# Patient Record
Sex: FEMALE | Race: WHITE | NOT HISPANIC OR LATINO | Employment: OTHER | URBAN - METROPOLITAN AREA
[De-identification: names, ages, dates, MRNs, and addresses within clinical notes are randomized per-mention and may not be internally consistent; named-entity substitution may affect disease eponyms.]

---

## 2017-01-01 ENCOUNTER — APPOINTMENT (OUTPATIENT)
Dept: CT IMAGING | Facility: HOSPITAL | Age: 67
End: 2017-01-01

## 2017-01-01 ENCOUNTER — HOSPITAL ENCOUNTER (INPATIENT)
Facility: HOSPITAL | Age: 67
LOS: 6 days | End: 2017-01-14
Attending: EMERGENCY MEDICINE | Admitting: FAMILY MEDICINE

## 2017-01-01 ENCOUNTER — APPOINTMENT (OUTPATIENT)
Dept: GENERAL RADIOLOGY | Facility: HOSPITAL | Age: 67
End: 2017-01-01

## 2017-01-01 ENCOUNTER — TELEPHONE (OUTPATIENT)
Dept: INTERNAL MEDICINE | Facility: CLINIC | Age: 67
End: 2017-01-01

## 2017-01-01 VITALS
BODY MASS INDEX: 22.28 KG/M2 | TEMPERATURE: 99.3 F | HEIGHT: 68 IN | DIASTOLIC BLOOD PRESSURE: 78 MMHG | HEART RATE: 89 BPM | OXYGEN SATURATION: 99 % | RESPIRATION RATE: 16 BRPM | SYSTOLIC BLOOD PRESSURE: 173 MMHG | WEIGHT: 147 LBS

## 2017-01-01 DIAGNOSIS — R73.9 HYPERGLYCEMIA: ICD-10-CM

## 2017-01-01 DIAGNOSIS — E11.01 HYPEROSMOLAR COMA (HCC): ICD-10-CM

## 2017-01-01 DIAGNOSIS — E87.5 HYPERKALEMIA: ICD-10-CM

## 2017-01-01 DIAGNOSIS — N39.0 SEPSIS DUE TO URINARY TRACT INFECTION (HCC): ICD-10-CM

## 2017-01-01 DIAGNOSIS — I95.89 OTHER SPECIFIED HYPOTENSION: ICD-10-CM

## 2017-01-01 DIAGNOSIS — E08.11 DIABETIC KETOACIDOSIS WITH COMA ASSOCIATED WITH DIABETES MELLITUS DUE TO UNDERLYING CONDITION (HCC): Primary | ICD-10-CM

## 2017-01-01 DIAGNOSIS — R40.0 SOMNOLENCE: ICD-10-CM

## 2017-01-01 DIAGNOSIS — A41.9 SEPSIS DUE TO URINARY TRACT INFECTION (HCC): ICD-10-CM

## 2017-01-01 LAB
ALBUMIN SERPL-MCNC: 2.9 G/DL (ref 3.2–4.8)
ALBUMIN SERPL-MCNC: 3.2 G/DL (ref 3.2–4.8)
ALBUMIN/GLOB SERPL: 1.1 G/DL (ref 1.5–2.5)
ALBUMIN/GLOB SERPL: 1.1 G/DL (ref 1.5–2.5)
ALBUMIN/GLOB SERPL: 1.2 G/DL (ref 1.5–2.5)
ALBUMIN/GLOB SERPL: 1.2 G/DL (ref 1.5–2.5)
ALP SERPL-CCNC: 183 U/L (ref 25–100)
ALP SERPL-CCNC: 190 U/L (ref 25–100)
ALP SERPL-CCNC: 206 U/L (ref 25–100)
ALP SERPL-CCNC: 255 U/L (ref 25–100)
ALT SERPL W P-5'-P-CCNC: 10 U/L (ref 7–40)
ALT SERPL W P-5'-P-CCNC: 23 U/L (ref 7–40)
ALT SERPL W P-5'-P-CCNC: 3 U/L (ref 7–40)
ALT SERPL W P-5'-P-CCNC: 3 U/L (ref 7–40)
AMMONIA BLD-SCNC: 42 UMOL/L (ref 19–60)
ANION GAP SERPL CALCULATED.3IONS-SCNC: 10 MMOL/L (ref 3–11)
ANION GAP SERPL CALCULATED.3IONS-SCNC: 10 MMOL/L (ref 3–11)
ANION GAP SERPL CALCULATED.3IONS-SCNC: 11 MMOL/L (ref 3–11)
ANION GAP SERPL CALCULATED.3IONS-SCNC: 11 MMOL/L (ref 3–11)
ANION GAP SERPL CALCULATED.3IONS-SCNC: 12 MMOL/L (ref 3–11)
ANION GAP SERPL CALCULATED.3IONS-SCNC: 14 MMOL/L (ref 3–11)
ANION GAP SERPL CALCULATED.3IONS-SCNC: 15 MMOL/L (ref 3–11)
ANION GAP SERPL CALCULATED.3IONS-SCNC: 5 MMOL/L (ref 3–11)
ANION GAP SERPL CALCULATED.3IONS-SCNC: 7 MMOL/L (ref 3–11)
ANION GAP SERPL CALCULATED.3IONS-SCNC: 9 MMOL/L (ref 3–11)
ANION GAP SERPL CALCULATED.3IONS-SCNC: 9 MMOL/L (ref 3–11)
ANION GAP SERPL CALCULATED.3IONS-SCNC: ABNORMAL MMOL/L (ref 3–11)
ARTERIAL PATENCY WRIST A: ABNORMAL
ARTERIAL PATENCY WRIST A: ABNORMAL
AST SERPL-CCNC: 12 U/L (ref 0–33)
AST SERPL-CCNC: 22 U/L (ref 0–33)
AST SERPL-CCNC: 38 U/L (ref 0–33)
AST SERPL-CCNC: 63 U/L (ref 0–33)
ATMOSPHERIC PRESS: ABNORMAL MMHG
ATMOSPHERIC PRESS: ABNORMAL MMHG
B-OH-BUTYR SERPL-SCNC: 12.1 MMOL/L
BACTERIA SPEC AEROBE CULT: ABNORMAL
BACTERIA SPEC AEROBE CULT: ABNORMAL
BACTERIA SPEC AEROBE CULT: NORMAL
BACTERIA SPEC AEROBE CULT: NORMAL
BACTERIA UR QL AUTO: ABNORMAL /HPF
BASE EXCESS BLDA CALC-SCNC: -3.2 MMOL/L (ref 0–2)
BASE EXCESS BLDA CALC-SCNC: <-20 MMOL/L (ref 0–2)
BASOPHILS # BLD AUTO: 0.01 10*3/MM3 (ref 0–0.2)
BASOPHILS # BLD AUTO: 0.01 10*3/MM3 (ref 0–0.2)
BASOPHILS # BLD AUTO: 0.02 10*3/MM3 (ref 0–0.2)
BASOPHILS # BLD MANUAL: 0 10*3/MM3 (ref 0–0.2)
BASOPHILS NFR BLD AUTO: 0 % (ref 0–1)
BASOPHILS NFR BLD AUTO: 0.1 % (ref 0–1)
BASOPHILS NFR BLD AUTO: 0.1 % (ref 0–1)
BASOPHILS NFR BLD AUTO: 0.2 % (ref 0–1)
BDY SITE: ABNORMAL
BDY SITE: ABNORMAL
BILIRUB SERPL-MCNC: 0.2 MG/DL (ref 0.3–1.2)
BILIRUB SERPL-MCNC: 0.4 MG/DL (ref 0.3–1.2)
BILIRUB UR QL STRIP: ABNORMAL
BUN BLD-MCNC: 13 MG/DL (ref 9–23)
BUN BLD-MCNC: 14 MG/DL (ref 9–23)
BUN BLD-MCNC: 15 MG/DL (ref 9–23)
BUN BLD-MCNC: 21 MG/DL (ref 9–23)
BUN BLD-MCNC: 23 MG/DL (ref 9–23)
BUN BLD-MCNC: 24 MG/DL (ref 9–23)
BUN BLD-MCNC: 37 MG/DL (ref 9–23)
BUN BLD-MCNC: 41 MG/DL (ref 9–23)
BUN BLD-MCNC: 42 MG/DL (ref 9–23)
BUN BLD-MCNC: 45 MG/DL (ref 9–23)
BUN BLD-MCNC: 46 MG/DL (ref 9–23)
BUN BLD-MCNC: 46 MG/DL (ref 9–23)
BUN BLD-MCNC: 50 MG/DL (ref 9–23)
BUN/CREAT SERPL: 16.2 (ref 7–25)
BUN/CREAT SERPL: 18.4 (ref 7–25)
BUN/CREAT SERPL: 18.6 (ref 7–25)
BUN/CREAT SERPL: 18.6 (ref 7–25)
BUN/CREAT SERPL: 20 (ref 7–25)
BUN/CREAT SERPL: 20 (ref 7–25)
BUN/CREAT SERPL: 20.5 (ref 7–25)
BUN/CREAT SERPL: 21.7 (ref 7–25)
BUN/CREAT SERPL: 21.7 (ref 7–25)
BUN/CREAT SERPL: 21.8 (ref 7–25)
BUN/CREAT SERPL: 23 (ref 7–25)
BUN/CREAT SERPL: 23.3 (ref 7–25)
BUN/CREAT SERPL: 24.7 (ref 7–25)
BUN/CREAT SERPL: 25 (ref 7–25)
BUN/CREAT SERPL: 25.6 (ref 7–25)
C DIFF TOX GENS STL QL NAA+PROBE: DETECTED
CA-I SERPL ISE-MCNC: 0.96 MMOL/L (ref 1.12–1.32)
CA-I SERPL ISE-MCNC: 1.02 MMOL/L (ref 1.12–1.32)
CA-I SERPL ISE-MCNC: 1.03 MMOL/L (ref 1.12–1.32)
CA-I SERPL ISE-MCNC: 1.05 MMOL/L (ref 1.12–1.32)
CA-I SERPL ISE-MCNC: 1.05 MMOL/L (ref 1.12–1.32)
CA-I SERPL ISE-MCNC: 1.08 MMOL/L (ref 1.12–1.32)
CA-I SERPL ISE-MCNC: 1.09 MMOL/L (ref 1.12–1.32)
CA-I SERPL ISE-MCNC: 1.09 MMOL/L (ref 1.12–1.32)
CA-I SERPL ISE-MCNC: 1.12 MMOL/L (ref 1.12–1.32)
CA-I SERPL ISE-MCNC: 1.13 MMOL/L (ref 1.12–1.32)
CA-I SERPL ISE-MCNC: 1.19 MMOL/L (ref 1.12–1.32)
CALCIUM SPEC-SCNC: 7.5 MG/DL (ref 8.7–10.4)
CALCIUM SPEC-SCNC: 7.7 MG/DL (ref 8.7–10.4)
CALCIUM SPEC-SCNC: 7.8 MG/DL (ref 8.7–10.4)
CALCIUM SPEC-SCNC: 7.9 MG/DL (ref 8.7–10.4)
CALCIUM SPEC-SCNC: 8 MG/DL (ref 8.7–10.4)
CALCIUM SPEC-SCNC: 8.1 MG/DL (ref 8.7–10.4)
CALCIUM SPEC-SCNC: 8.6 MG/DL (ref 8.7–10.4)
CALCIUM SPEC-SCNC: 8.9 MG/DL (ref 8.7–10.4)
CHLORIDE SERPL-SCNC: 104 MMOL/L (ref 99–109)
CHLORIDE SERPL-SCNC: 108 MMOL/L (ref 99–109)
CHLORIDE SERPL-SCNC: 109 MMOL/L (ref 99–109)
CHLORIDE SERPL-SCNC: 109 MMOL/L (ref 99–109)
CHLORIDE SERPL-SCNC: 110 MMOL/L (ref 99–109)
CHLORIDE SERPL-SCNC: 112 MMOL/L (ref 99–109)
CHLORIDE SERPL-SCNC: 113 MMOL/L (ref 99–109)
CHLORIDE SERPL-SCNC: 114 MMOL/L (ref 99–109)
CHLORIDE SERPL-SCNC: 114 MMOL/L (ref 99–109)
CHLORIDE SERPL-SCNC: 116 MMOL/L (ref 99–109)
CHLORIDE SERPL-SCNC: 118 MMOL/L (ref 99–109)
CHLORIDE SERPL-SCNC: 119 MMOL/L (ref 99–109)
CHLORIDE SERPL-SCNC: 120 MMOL/L (ref 99–109)
CHLORIDE SERPL-SCNC: 123 MMOL/L (ref 99–109)
CHLORIDE SERPL-SCNC: 99 MMOL/L (ref 99–109)
CLARITY UR: ABNORMAL
CO2 BLDA-SCNC: 21.7 MMOL/L (ref 22–33)
CO2 BLDA-SCNC: 3.1 MMOL/L (ref 22–33)
CO2 SERPL-SCNC: 13 MMOL/L (ref 20–31)
CO2 SERPL-SCNC: 14 MMOL/L (ref 20–31)
CO2 SERPL-SCNC: 18 MMOL/L (ref 20–31)
CO2 SERPL-SCNC: 19 MMOL/L (ref 20–31)
CO2 SERPL-SCNC: 21 MMOL/L (ref 20–31)
CO2 SERPL-SCNC: 22 MMOL/L (ref 20–31)
CO2 SERPL-SCNC: 25 MMOL/L (ref 20–31)
CO2 SERPL-SCNC: <10 MMOL/L (ref 20–31)
COARSE GRAN CASTS URNS QL MICRO: ABNORMAL /LPF
COHGB MFR BLD: 1 % (ref 0–2)
COHGB MFR BLD: 1.3 % (ref 0–2)
COLOR UR: YELLOW
CREAT BLD-MCNC: 0.6 MG/DL (ref 0.6–1.3)
CREAT BLD-MCNC: 0.7 MG/DL (ref 0.6–1.3)
CREAT BLD-MCNC: 0.7 MG/DL (ref 0.6–1.3)
CREAT BLD-MCNC: 0.9 MG/DL (ref 0.6–1.3)
CREAT BLD-MCNC: 1 MG/DL (ref 0.6–1.3)
CREAT BLD-MCNC: 1.1 MG/DL (ref 0.6–1.3)
CREAT BLD-MCNC: 1.5 MG/DL (ref 0.6–1.3)
CREAT BLD-MCNC: 1.8 MG/DL (ref 0.6–1.3)
CREAT BLD-MCNC: 2.2 MG/DL (ref 0.6–1.3)
CREAT BLD-MCNC: 2.2 MG/DL (ref 0.6–1.3)
CREAT BLD-MCNC: 2.5 MG/DL (ref 0.6–1.3)
CREAT BLD-MCNC: 2.5 MG/DL (ref 0.6–1.3)
CREAT BLD-MCNC: 2.6 MG/DL (ref 0.6–1.3)
D-LACTATE SERPL-SCNC: 1.7 MMOL/L (ref 0.5–2)
D-LACTATE SERPL-SCNC: 7 MMOL/L (ref 0.5–2)
D-LACTATE SERPL-SCNC: 8.8 MMOL/L (ref 0.5–2)
DEPRECATED RDW RBC AUTO: 47.8 FL (ref 37–54)
DEPRECATED RDW RBC AUTO: 50.7 FL (ref 37–54)
DEPRECATED RDW RBC AUTO: 59 FL (ref 37–54)
DEPRECATED RDW RBC AUTO: 59.4 FL (ref 37–54)
DEPRECATED RDW RBC AUTO: 59.6 FL (ref 37–54)
EOSINOPHIL # BLD AUTO: 0.02 10*3/MM3 (ref 0.1–0.3)
EOSINOPHIL # BLD AUTO: 0.03 10*3/MM3 (ref 0.1–0.3)
EOSINOPHIL # BLD AUTO: 0.09 10*3/MM3 (ref 0.1–0.3)
EOSINOPHIL # BLD MANUAL: 0 10*3/MM3 (ref 0.1–0.3)
EOSINOPHIL NFR BLD AUTO: 0.1 % (ref 0–3)
EOSINOPHIL NFR BLD AUTO: 0.3 % (ref 0–3)
EOSINOPHIL NFR BLD AUTO: 0.9 % (ref 0–3)
EOSINOPHIL NFR BLD MANUAL: 0 % (ref 0–3)
ERYTHROCYTE [DISTWIDTH] IN BLOOD BY AUTOMATED COUNT: 13.9 % (ref 11.3–14.5)
ERYTHROCYTE [DISTWIDTH] IN BLOOD BY AUTOMATED COUNT: 14.5 % (ref 11.3–14.5)
ERYTHROCYTE [DISTWIDTH] IN BLOOD BY AUTOMATED COUNT: 14.6 % (ref 11.3–14.5)
ERYTHROCYTE [DISTWIDTH] IN BLOOD BY AUTOMATED COUNT: 14.9 % (ref 11.3–14.5)
ERYTHROCYTE [DISTWIDTH] IN BLOOD BY AUTOMATED COUNT: 15.1 % (ref 11.3–14.5)
GFR SERPL CREATININE-BSD FRML MDRD: 100 ML/MIN/1.73
GFR SERPL CREATININE-BSD FRML MDRD: 18 ML/MIN/1.73
GFR SERPL CREATININE-BSD FRML MDRD: 19 ML/MIN/1.73
GFR SERPL CREATININE-BSD FRML MDRD: 19 ML/MIN/1.73
GFR SERPL CREATININE-BSD FRML MDRD: 22 ML/MIN/1.73
GFR SERPL CREATININE-BSD FRML MDRD: 22 ML/MIN/1.73
GFR SERPL CREATININE-BSD FRML MDRD: 28 ML/MIN/1.73
GFR SERPL CREATININE-BSD FRML MDRD: 35 ML/MIN/1.73
GFR SERPL CREATININE-BSD FRML MDRD: 50 ML/MIN/1.73
GFR SERPL CREATININE-BSD FRML MDRD: 55 ML/MIN/1.73
GFR SERPL CREATININE-BSD FRML MDRD: 62 ML/MIN/1.73
GFR SERPL CREATININE-BSD FRML MDRD: 83 ML/MIN/1.73
GFR SERPL CREATININE-BSD FRML MDRD: 83 ML/MIN/1.73
GLOBULIN UR ELPH-MCNC: 2.4 GM/DL
GLOBULIN UR ELPH-MCNC: 2.4 GM/DL
GLOBULIN UR ELPH-MCNC: 2.6 GM/DL
GLOBULIN UR ELPH-MCNC: 2.8 GM/DL
GLUCOSE BLD-MCNC: 1052 MG/DL (ref 70–100)
GLUCOSE BLD-MCNC: 111 MG/DL (ref 70–100)
GLUCOSE BLD-MCNC: 112 MG/DL (ref 70–100)
GLUCOSE BLD-MCNC: 156 MG/DL (ref 70–100)
GLUCOSE BLD-MCNC: 172 MG/DL (ref 70–100)
GLUCOSE BLD-MCNC: 176 MG/DL (ref 70–100)
GLUCOSE BLD-MCNC: 196 MG/DL (ref 70–100)
GLUCOSE BLD-MCNC: 203 MG/DL (ref 70–100)
GLUCOSE BLD-MCNC: 208 MG/DL (ref 70–100)
GLUCOSE BLD-MCNC: 245 MG/DL (ref 70–100)
GLUCOSE BLD-MCNC: 246 MG/DL (ref 70–100)
GLUCOSE BLD-MCNC: 247 MG/DL (ref 70–100)
GLUCOSE BLD-MCNC: 330 MG/DL (ref 70–100)
GLUCOSE BLD-MCNC: 479 MG/DL (ref 70–100)
GLUCOSE BLD-MCNC: 645 MG/DL (ref 70–100)
GLUCOSE BLD-MCNC: 812 MG/DL (ref 70–100)
GLUCOSE BLD-MCNC: 940 MG/DL (ref 70–100)
GLUCOSE BLDC GLUCOMTR-MCNC: 103 MG/DL (ref 70–130)
GLUCOSE BLDC GLUCOMTR-MCNC: 113 MG/DL (ref 70–130)
GLUCOSE BLDC GLUCOMTR-MCNC: 118 MG/DL (ref 70–130)
GLUCOSE BLDC GLUCOMTR-MCNC: 118 MG/DL (ref 70–130)
GLUCOSE BLDC GLUCOMTR-MCNC: 119 MG/DL (ref 70–130)
GLUCOSE BLDC GLUCOMTR-MCNC: 125 MG/DL (ref 70–130)
GLUCOSE BLDC GLUCOMTR-MCNC: 127 MG/DL (ref 70–130)
GLUCOSE BLDC GLUCOMTR-MCNC: 130 MG/DL (ref 70–130)
GLUCOSE BLDC GLUCOMTR-MCNC: 132 MG/DL (ref 70–130)
GLUCOSE BLDC GLUCOMTR-MCNC: 133 MG/DL (ref 70–130)
GLUCOSE BLDC GLUCOMTR-MCNC: 135 MG/DL (ref 70–130)
GLUCOSE BLDC GLUCOMTR-MCNC: 136 MG/DL (ref 70–130)
GLUCOSE BLDC GLUCOMTR-MCNC: 137 MG/DL (ref 70–130)
GLUCOSE BLDC GLUCOMTR-MCNC: 139 MG/DL (ref 70–130)
GLUCOSE BLDC GLUCOMTR-MCNC: 140 MG/DL (ref 70–130)
GLUCOSE BLDC GLUCOMTR-MCNC: 143 MG/DL (ref 70–130)
GLUCOSE BLDC GLUCOMTR-MCNC: 144 MG/DL (ref 70–130)
GLUCOSE BLDC GLUCOMTR-MCNC: 145 MG/DL (ref 70–130)
GLUCOSE BLDC GLUCOMTR-MCNC: 147 MG/DL (ref 70–130)
GLUCOSE BLDC GLUCOMTR-MCNC: 147 MG/DL (ref 70–130)
GLUCOSE BLDC GLUCOMTR-MCNC: 149 MG/DL (ref 70–130)
GLUCOSE BLDC GLUCOMTR-MCNC: 150 MG/DL (ref 70–130)
GLUCOSE BLDC GLUCOMTR-MCNC: 150 MG/DL (ref 70–130)
GLUCOSE BLDC GLUCOMTR-MCNC: 153 MG/DL (ref 70–130)
GLUCOSE BLDC GLUCOMTR-MCNC: 155 MG/DL (ref 70–130)
GLUCOSE BLDC GLUCOMTR-MCNC: 157 MG/DL (ref 70–130)
GLUCOSE BLDC GLUCOMTR-MCNC: 160 MG/DL (ref 70–130)
GLUCOSE BLDC GLUCOMTR-MCNC: 161 MG/DL (ref 70–130)
GLUCOSE BLDC GLUCOMTR-MCNC: 165 MG/DL (ref 70–130)
GLUCOSE BLDC GLUCOMTR-MCNC: 168 MG/DL (ref 70–130)
GLUCOSE BLDC GLUCOMTR-MCNC: 169 MG/DL (ref 70–130)
GLUCOSE BLDC GLUCOMTR-MCNC: 170 MG/DL (ref 70–130)
GLUCOSE BLDC GLUCOMTR-MCNC: 171 MG/DL (ref 70–130)
GLUCOSE BLDC GLUCOMTR-MCNC: 171 MG/DL (ref 70–130)
GLUCOSE BLDC GLUCOMTR-MCNC: 172 MG/DL (ref 70–130)
GLUCOSE BLDC GLUCOMTR-MCNC: 178 MG/DL (ref 70–130)
GLUCOSE BLDC GLUCOMTR-MCNC: 181 MG/DL (ref 70–130)
GLUCOSE BLDC GLUCOMTR-MCNC: 181 MG/DL (ref 70–130)
GLUCOSE BLDC GLUCOMTR-MCNC: 190 MG/DL (ref 70–130)
GLUCOSE BLDC GLUCOMTR-MCNC: 191 MG/DL (ref 70–130)
GLUCOSE BLDC GLUCOMTR-MCNC: 197 MG/DL (ref 70–130)
GLUCOSE BLDC GLUCOMTR-MCNC: 198 MG/DL (ref 70–130)
GLUCOSE BLDC GLUCOMTR-MCNC: 202 MG/DL (ref 70–130)
GLUCOSE BLDC GLUCOMTR-MCNC: 203 MG/DL (ref 70–130)
GLUCOSE BLDC GLUCOMTR-MCNC: 205 MG/DL (ref 70–130)
GLUCOSE BLDC GLUCOMTR-MCNC: 206 MG/DL (ref 70–130)
GLUCOSE BLDC GLUCOMTR-MCNC: 209 MG/DL (ref 70–130)
GLUCOSE BLDC GLUCOMTR-MCNC: 210 MG/DL (ref 70–130)
GLUCOSE BLDC GLUCOMTR-MCNC: 214 MG/DL (ref 70–130)
GLUCOSE BLDC GLUCOMTR-MCNC: 215 MG/DL (ref 70–130)
GLUCOSE BLDC GLUCOMTR-MCNC: 215 MG/DL (ref 70–130)
GLUCOSE BLDC GLUCOMTR-MCNC: 218 MG/DL (ref 70–130)
GLUCOSE BLDC GLUCOMTR-MCNC: 224 MG/DL (ref 70–130)
GLUCOSE BLDC GLUCOMTR-MCNC: 226 MG/DL (ref 70–130)
GLUCOSE BLDC GLUCOMTR-MCNC: 241 MG/DL (ref 70–130)
GLUCOSE BLDC GLUCOMTR-MCNC: 245 MG/DL (ref 70–130)
GLUCOSE BLDC GLUCOMTR-MCNC: 252 MG/DL (ref 70–130)
GLUCOSE BLDC GLUCOMTR-MCNC: 252 MG/DL (ref 70–130)
GLUCOSE BLDC GLUCOMTR-MCNC: 265 MG/DL (ref 70–130)
GLUCOSE BLDC GLUCOMTR-MCNC: 273 MG/DL (ref 70–130)
GLUCOSE BLDC GLUCOMTR-MCNC: 290 MG/DL (ref 70–130)
GLUCOSE BLDC GLUCOMTR-MCNC: 290 MG/DL (ref 70–130)
GLUCOSE BLDC GLUCOMTR-MCNC: 291 MG/DL (ref 70–130)
GLUCOSE BLDC GLUCOMTR-MCNC: 297 MG/DL (ref 70–130)
GLUCOSE BLDC GLUCOMTR-MCNC: 353 MG/DL (ref 70–130)
GLUCOSE BLDC GLUCOMTR-MCNC: 358 MG/DL (ref 70–130)
GLUCOSE BLDC GLUCOMTR-MCNC: 421 MG/DL (ref 70–130)
GLUCOSE BLDC GLUCOMTR-MCNC: 525 MG/DL (ref 70–130)
GLUCOSE BLDC GLUCOMTR-MCNC: 92 MG/DL (ref 70–130)
GLUCOSE BLDC GLUCOMTR-MCNC: 97 MG/DL (ref 70–130)
GLUCOSE BLDC GLUCOMTR-MCNC: 98 MG/DL (ref 70–130)
GLUCOSE BLDC GLUCOMTR-MCNC: >599 MG/DL (ref 70–130)
GLUCOSE BLDC GLUCOMTR-MCNC: >599 MG/DL (ref 70–130)
GLUCOSE UR STRIP-MCNC: ABNORMAL MG/DL
HBA1C MFR BLD: 9.8 % (ref 4.8–5.6)
HCO3 BLDA-SCNC: 2.6 MMOL/L (ref 20–26)
HCO3 BLDA-SCNC: 20.7 MMOL/L (ref 20–26)
HCT VFR BLD AUTO: 30.5 % (ref 34.5–44)
HCT VFR BLD AUTO: 31.5 % (ref 34.5–44)
HCT VFR BLD AUTO: 31.7 % (ref 34.5–44)
HCT VFR BLD AUTO: 32.3 % (ref 34.5–44)
HCT VFR BLD AUTO: 34.5 % (ref 34.5–44)
HCT VFR BLD CALC: 30.3 %
HCT VFR BLD CALC: 31.6 %
HGB BLD-MCNC: 10.1 G/DL (ref 11.5–15.5)
HGB BLD-MCNC: 10.2 G/DL (ref 11.5–15.5)
HGB BLD-MCNC: 10.2 G/DL (ref 11.5–15.5)
HGB BLD-MCNC: 8.6 G/DL (ref 11.5–15.5)
HGB BLD-MCNC: 8.8 G/DL (ref 11.5–15.5)
HGB BLDA-MCNC: 10.3 G/DL (ref 14–18)
HGB BLDA-MCNC: 9.9 G/DL (ref 14–18)
HGB UR QL STRIP.AUTO: ABNORMAL
HOLD SPECIMEN: NORMAL
HOLD SPECIMEN: NORMAL
HOROWITZ INDEX BLD+IHG-RTO: 21 %
HOROWITZ INDEX BLD+IHG-RTO: 80 %
HYALINE CASTS UR QL AUTO: ABNORMAL /LPF
IMM GRANULOCYTES # BLD: 0.03 10*3/MM3 (ref 0–0.03)
IMM GRANULOCYTES # BLD: 0.04 10*3/MM3 (ref 0–0.03)
IMM GRANULOCYTES # BLD: 0.16 10*3/MM3 (ref 0–0.03)
IMM GRANULOCYTES NFR BLD: 0.3 % (ref 0–0.6)
IMM GRANULOCYTES NFR BLD: 0.4 % (ref 0–0.6)
IMM GRANULOCYTES NFR BLD: 0.9 % (ref 0–0.6)
KETONES UR QL STRIP: ABNORMAL
LEUKOCYTE ESTERASE UR QL STRIP.AUTO: ABNORMAL
LYMPHOCYTES # BLD AUTO: 1.41 10*3/MM3 (ref 0.6–4.8)
LYMPHOCYTES # BLD AUTO: 2.22 10*3/MM3 (ref 0.6–4.8)
LYMPHOCYTES # BLD AUTO: 2.27 10*3/MM3 (ref 0.6–4.8)
LYMPHOCYTES # BLD MANUAL: 1.21 10*3/MM3 (ref 0.6–4.8)
LYMPHOCYTES NFR BLD AUTO: 20.3 % (ref 24–44)
LYMPHOCYTES NFR BLD AUTO: 23.5 % (ref 24–44)
LYMPHOCYTES NFR BLD AUTO: 8 % (ref 24–44)
LYMPHOCYTES NFR BLD MANUAL: 3 % (ref 0–12)
LYMPHOCYTES NFR BLD MANUAL: 7 % (ref 24–44)
MACROCYTES BLD QL SMEAR: ABNORMAL
MACROCYTES BLD QL SMEAR: NORMAL
MAGNESIUM SERPL-MCNC: 1.9 MG/DL (ref 1.3–2.7)
MAGNESIUM SERPL-MCNC: 2 MG/DL (ref 1.3–2.7)
MAGNESIUM SERPL-MCNC: 2.1 MG/DL (ref 1.3–2.7)
MAGNESIUM SERPL-MCNC: 2.1 MG/DL (ref 1.3–2.7)
MAGNESIUM SERPL-MCNC: 2.2 MG/DL (ref 1.3–2.7)
MAGNESIUM SERPL-MCNC: 2.2 MG/DL (ref 1.3–2.7)
MAGNESIUM SERPL-MCNC: 2.3 MG/DL (ref 1.3–2.7)
MAGNESIUM SERPL-MCNC: 2.5 MG/DL (ref 1.3–2.7)
MAGNESIUM SERPL-MCNC: 2.6 MG/DL (ref 1.3–2.7)
MAGNESIUM SERPL-MCNC: 2.6 MG/DL (ref 1.3–2.7)
MAGNESIUM SERPL-MCNC: 2.7 MG/DL (ref 1.3–2.7)
MAGNESIUM SERPL-MCNC: 2.9 MG/DL (ref 1.3–2.7)
MCH RBC QN AUTO: 29.6 PG (ref 27–31)
MCH RBC QN AUTO: 30.6 PG (ref 27–31)
MCH RBC QN AUTO: 30.8 PG (ref 27–31)
MCH RBC QN AUTO: 31.4 PG (ref 27–31)
MCH RBC QN AUTO: 31.6 PG (ref 27–31)
MCHC RBC AUTO-ENTMCNC: 27.1 G/DL (ref 32–36)
MCHC RBC AUTO-ENTMCNC: 28.9 G/DL (ref 32–36)
MCHC RBC AUTO-ENTMCNC: 29.3 G/DL (ref 32–36)
MCHC RBC AUTO-ENTMCNC: 31.6 G/DL (ref 32–36)
MCHC RBC AUTO-ENTMCNC: 32.4 G/DL (ref 32–36)
MCV RBC AUTO: 107.8 FL (ref 80–99)
MCV RBC AUTO: 108.9 FL (ref 80–99)
MCV RBC AUTO: 112.8 FL (ref 80–99)
MCV RBC AUTO: 93.6 FL (ref 80–99)
MCV RBC AUTO: 95.2 FL (ref 80–99)
METHGB BLD QL: 0.9 % (ref 0–1.5)
METHGB BLD QL: 1.5 % (ref 0–1.5)
MODALITY: ABNORMAL
MODALITY: ABNORMAL
MONOCYTES # BLD AUTO: 0.52 10*3/MM3 (ref 0–1)
MONOCYTES # BLD AUTO: 0.57 10*3/MM3 (ref 0–1)
MONOCYTES # BLD AUTO: 0.69 10*3/MM3 (ref 0–1)
MONOCYTES # BLD AUTO: 0.97 10*3/MM3 (ref 0–1)
MONOCYTES NFR BLD AUTO: 3.2 % (ref 0–12)
MONOCYTES NFR BLD AUTO: 7.2 % (ref 0–12)
MONOCYTES NFR BLD AUTO: 8.9 % (ref 0–12)
MYELOCYTES NFR BLD MANUAL: 1 % (ref 0–0)
NEUTROPHILS # BLD AUTO: 15.38 10*3/MM3 (ref 1.5–8.3)
NEUTROPHILS # BLD AUTO: 15.4 10*3/MM3 (ref 1.5–8.3)
NEUTROPHILS # BLD AUTO: 6.55 10*3/MM3 (ref 1.5–8.3)
NEUTROPHILS # BLD AUTO: 7.68 10*3/MM3 (ref 1.5–8.3)
NEUTROPHILS NFR BLD AUTO: 67.9 % (ref 41–71)
NEUTROPHILS NFR BLD AUTO: 70 % (ref 41–71)
NEUTROPHILS NFR BLD AUTO: 87.7 % (ref 41–71)
NEUTROPHILS NFR BLD MANUAL: 85 % (ref 41–71)
NEUTS BAND NFR BLD MANUAL: 4 % (ref 0–5)
NITRITE UR QL STRIP: NEGATIVE
OXYHGB MFR BLDV: 92 % (ref 94–99)
OXYHGB MFR BLDV: 95.6 % (ref 94–99)
PCO2 BLDA: 15.3 MM HG (ref 35–45)
PCO2 BLDA: 30.8 MM HG (ref 35–45)
PH BLDA: 6.84 PH UNITS (ref 7.35–7.45)
PH BLDA: 7.44 PH UNITS (ref 7.35–7.45)
PH BLDV: 7.37 [PH]
PH UR STRIP.AUTO: 5.5 [PH] (ref 5–8)
PHOSPHATE SERPL-MCNC: 1.2 MG/DL (ref 2.4–5.1)
PHOSPHATE SERPL-MCNC: 1.4 MG/DL (ref 2.4–5.1)
PHOSPHATE SERPL-MCNC: 1.5 MG/DL (ref 2.4–5.1)
PHOSPHATE SERPL-MCNC: 1.7 MG/DL (ref 2.4–5.1)
PHOSPHATE SERPL-MCNC: 1.7 MG/DL (ref 2.4–5.1)
PHOSPHATE SERPL-MCNC: 2.5 MG/DL (ref 2.4–5.1)
PHOSPHATE SERPL-MCNC: 2.8 MG/DL (ref 2.4–5.1)
PHOSPHATE SERPL-MCNC: 2.8 MG/DL (ref 2.4–5.1)
PHOSPHATE SERPL-MCNC: 3.3 MG/DL (ref 2.4–5.1)
PHOSPHATE SERPL-MCNC: 3.3 MG/DL (ref 2.4–5.1)
PHOSPHATE SERPL-MCNC: 3.4 MG/DL (ref 2.4–5.1)
PHOSPHATE SERPL-MCNC: 8.4 MG/DL (ref 2.4–5.1)
PLAT MORPH BLD: NORMAL
PLAT MORPH BLD: NORMAL
PLATELET # BLD AUTO: 262 10*3/MM3 (ref 150–450)
PLATELET # BLD AUTO: 294 10*3/MM3 (ref 150–450)
PLATELET # BLD AUTO: 365 10*3/MM3 (ref 150–450)
PLATELET # BLD AUTO: 396 10*3/MM3 (ref 150–450)
PLATELET # BLD AUTO: 448 10*3/MM3 (ref 150–450)
PMV BLD AUTO: 10 FL (ref 6–12)
PMV BLD AUTO: 10 FL (ref 6–12)
PMV BLD AUTO: 10.4 FL (ref 6–12)
PMV BLD AUTO: 9.8 FL (ref 6–12)
PMV BLD AUTO: 9.9 FL (ref 6–12)
PO2 BLDA: 134 MM HG (ref 83–108)
PO2 BLDA: 85.4 MM HG (ref 83–108)
POLYCHROMASIA BLD QL SMEAR: ABNORMAL
POLYCHROMASIA BLD QL SMEAR: NORMAL
POTASSIUM BLD-SCNC: 3.8 MMOL/L (ref 3.5–5.5)
POTASSIUM BLD-SCNC: 3.8 MMOL/L (ref 3.5–5.5)
POTASSIUM BLD-SCNC: 3.9 MMOL/L (ref 3.5–5.5)
POTASSIUM BLD-SCNC: 3.9 MMOL/L (ref 3.5–5.5)
POTASSIUM BLD-SCNC: 4.2 MMOL/L (ref 3.5–5.5)
POTASSIUM BLD-SCNC: 4.3 MMOL/L (ref 3.5–5.5)
POTASSIUM BLD-SCNC: 4.4 MMOL/L (ref 3.5–5.5)
POTASSIUM BLD-SCNC: 4.6 MMOL/L (ref 3.5–5.5)
POTASSIUM BLD-SCNC: 5 MMOL/L (ref 3.5–5.5)
POTASSIUM BLD-SCNC: 5.2 MMOL/L (ref 3.5–5.5)
POTASSIUM BLD-SCNC: 5.7 MMOL/L (ref 3.5–5.5)
POTASSIUM BLD-SCNC: 6.6 MMOL/L (ref 3.5–5.5)
PROT SERPL-MCNC: 5.3 G/DL (ref 5.7–8.2)
PROT SERPL-MCNC: 5.3 G/DL (ref 5.7–8.2)
PROT SERPL-MCNC: 5.5 G/DL (ref 5.7–8.2)
PROT SERPL-MCNC: 6 G/DL (ref 5.7–8.2)
PROT UR QL STRIP: ABNORMAL
RBC # BLD AUTO: 2.8 10*6/MM3 (ref 3.89–5.14)
RBC # BLD AUTO: 2.81 10*6/MM3 (ref 3.89–5.14)
RBC # BLD AUTO: 3.2 10*6/MM3 (ref 3.89–5.14)
RBC # BLD AUTO: 3.31 10*6/MM3 (ref 3.89–5.14)
RBC # BLD AUTO: 3.45 10*6/MM3 (ref 3.89–5.14)
RBC # UR: ABNORMAL /HPF
REF LAB TEST METHOD: ABNORMAL
SODIUM BLD-SCNC: 136 MMOL/L (ref 132–146)
SODIUM BLD-SCNC: 139 MMOL/L (ref 132–146)
SODIUM BLD-SCNC: 139 MMOL/L (ref 132–146)
SODIUM BLD-SCNC: 140 MMOL/L (ref 132–146)
SODIUM BLD-SCNC: 141 MMOL/L (ref 132–146)
SODIUM BLD-SCNC: 141 MMOL/L (ref 132–146)
SODIUM BLD-SCNC: 143 MMOL/L (ref 132–146)
SODIUM BLD-SCNC: 143 MMOL/L (ref 132–146)
SODIUM BLD-SCNC: 145 MMOL/L (ref 132–146)
SODIUM BLD-SCNC: 146 MMOL/L (ref 132–146)
SODIUM BLD-SCNC: 147 MMOL/L (ref 132–146)
SODIUM BLD-SCNC: 150 MMOL/L (ref 132–146)
SODIUM BLD-SCNC: 150 MMOL/L (ref 132–146)
SP GR UR STRIP: 1.02 (ref 1–1.03)
SQUAMOUS #/AREA URNS HPF: ABNORMAL /HPF
TSH SERPL DL<=0.05 MIU/L-ACNC: 1.21 MIU/ML (ref 0.35–5.35)
UROBILINOGEN UR QL STRIP: ABNORMAL
VANCOMYCIN SERPL-MCNC: 16 MCG/ML (ref 5–40)
VANCOMYCIN SERPL-MCNC: 17.5 MCG/ML (ref 5–40)
WBC MORPH BLD: NORMAL
WBC MORPH BLD: NORMAL
WBC NRBC COR # BLD: 10.95 10*3/MM3 (ref 3.5–10.8)
WBC NRBC COR # BLD: 17.28 10*3/MM3 (ref 3.5–10.8)
WBC NRBC COR # BLD: 17.57 10*3/MM3 (ref 3.5–10.8)
WBC NRBC COR # BLD: 19.67 10*3/MM3 (ref 3.5–10.8)
WBC NRBC COR # BLD: 9.65 10*3/MM3 (ref 3.5–10.8)
WBC UR QL AUTO: ABNORMAL /HPF
WHOLE BLOOD HOLD SPECIMEN: NORMAL
WHOLE BLOOD HOLD SPECIMEN: NORMAL
YEAST URNS QL MICRO: ABNORMAL /HPF

## 2017-01-01 PROCEDURE — 84100 ASSAY OF PHOSPHORUS: CPT | Performed by: INTERNAL MEDICINE

## 2017-01-01 PROCEDURE — 71010 HC CHEST PA OR AP: CPT

## 2017-01-01 PROCEDURE — 25010000002 INSULIN REGULAR HUMAN PER 5 UNITS: Performed by: EMERGENCY MEDICINE

## 2017-01-01 PROCEDURE — 80053 COMPREHEN METABOLIC PANEL: CPT | Performed by: INTERNAL MEDICINE

## 2017-01-01 PROCEDURE — 99233 SBSQ HOSP IP/OBS HIGH 50: CPT | Performed by: INTERNAL MEDICINE

## 2017-01-01 PROCEDURE — 63710000001 INSULIN REGULAR HUMAN PER 5 UNITS: Performed by: EMERGENCY MEDICINE

## 2017-01-01 PROCEDURE — 82805 BLOOD GASES W/O2 SATURATION: CPT | Performed by: EMERGENCY MEDICINE

## 2017-01-01 PROCEDURE — 80202 ASSAY OF VANCOMYCIN: CPT

## 2017-01-01 PROCEDURE — 25010000002 MEROPENEM

## 2017-01-01 PROCEDURE — 25010000002 HEPARIN (PORCINE) PER 1000 UNITS: Performed by: INTERNAL MEDICINE

## 2017-01-01 PROCEDURE — 82962 GLUCOSE BLOOD TEST: CPT

## 2017-01-01 PROCEDURE — 36600 WITHDRAWAL OF ARTERIAL BLOOD: CPT | Performed by: EMERGENCY MEDICINE

## 2017-01-01 PROCEDURE — 99291 CRITICAL CARE FIRST HOUR: CPT | Performed by: INTERNAL MEDICINE

## 2017-01-01 PROCEDURE — 83735 ASSAY OF MAGNESIUM: CPT | Performed by: EMERGENCY MEDICINE

## 2017-01-01 PROCEDURE — 25010000003 FLUCONAZOLE PER 200 MG: Performed by: INTERNAL MEDICINE

## 2017-01-01 PROCEDURE — 82805 BLOOD GASES W/O2 SATURATION: CPT | Performed by: INTERNAL MEDICINE

## 2017-01-01 PROCEDURE — 85007 BL SMEAR W/DIFF WBC COUNT: CPT | Performed by: EMERGENCY MEDICINE

## 2017-01-01 PROCEDURE — 82330 ASSAY OF CALCIUM: CPT

## 2017-01-01 PROCEDURE — 82330 ASSAY OF CALCIUM: CPT | Performed by: INTERNAL MEDICINE

## 2017-01-01 PROCEDURE — 63710000001 INSULIN REGULAR HUMAN PER 5 UNITS: Performed by: INTERNAL MEDICINE

## 2017-01-01 PROCEDURE — 85027 COMPLETE CBC AUTOMATED: CPT | Performed by: INTERNAL MEDICINE

## 2017-01-01 PROCEDURE — 85025 COMPLETE CBC W/AUTO DIFF WBC: CPT | Performed by: EMERGENCY MEDICINE

## 2017-01-01 PROCEDURE — 25010000002 MORPHINE SULFATE (PF) 2 MG/ML SOLUTION: Performed by: NURSE PRACTITIONER

## 2017-01-01 PROCEDURE — 84443 ASSAY THYROID STIM HORMONE: CPT | Performed by: INTERNAL MEDICINE

## 2017-01-01 PROCEDURE — 80053 COMPREHEN METABOLIC PANEL: CPT | Performed by: EMERGENCY MEDICINE

## 2017-01-01 PROCEDURE — 25010000002 TIGECYCLINE PER 1 MG: Performed by: INTERNAL MEDICINE

## 2017-01-01 PROCEDURE — 87106 FUNGI IDENTIFICATION YEAST: CPT | Performed by: EMERGENCY MEDICINE

## 2017-01-01 PROCEDURE — 36415 COLL VENOUS BLD VENIPUNCTURE: CPT

## 2017-01-01 PROCEDURE — 82140 ASSAY OF AMMONIA: CPT | Performed by: EMERGENCY MEDICINE

## 2017-01-01 PROCEDURE — 36600 WITHDRAWAL OF ARTERIAL BLOOD: CPT | Performed by: INTERNAL MEDICINE

## 2017-01-01 PROCEDURE — 84100 ASSAY OF PHOSPHORUS: CPT

## 2017-01-01 PROCEDURE — 25010000003 POTASSIUM CHLORIDE 10 MEQ/100ML SOLUTION: Performed by: INTERNAL MEDICINE

## 2017-01-01 PROCEDURE — 83735 ASSAY OF MAGNESIUM: CPT

## 2017-01-01 PROCEDURE — 70450 CT HEAD/BRAIN W/O DYE: CPT

## 2017-01-01 PROCEDURE — 83735 ASSAY OF MAGNESIUM: CPT | Performed by: INTERNAL MEDICINE

## 2017-01-01 PROCEDURE — 25010000002 KETOROLAC TROMETHAMINE PER 15 MG: Performed by: NURSE PRACTITIONER

## 2017-01-01 PROCEDURE — 83605 ASSAY OF LACTIC ACID: CPT | Performed by: INTERNAL MEDICINE

## 2017-01-01 PROCEDURE — 87086 URINE CULTURE/COLONY COUNT: CPT | Performed by: INTERNAL MEDICINE

## 2017-01-01 PROCEDURE — 83036 HEMOGLOBIN GLYCOSYLATED A1C: CPT | Performed by: INTERNAL MEDICINE

## 2017-01-01 PROCEDURE — 25010000002 FLUCONAZOLE PER 200 MG

## 2017-01-01 PROCEDURE — 25010000002 VANCOMYCIN PER 500 MG

## 2017-01-01 PROCEDURE — 85007 BL SMEAR W/DIFF WBC COUNT: CPT | Performed by: INTERNAL MEDICINE

## 2017-01-01 PROCEDURE — 85025 COMPLETE CBC W/AUTO DIFF WBC: CPT | Performed by: INTERNAL MEDICINE

## 2017-01-01 PROCEDURE — 87040 BLOOD CULTURE FOR BACTERIA: CPT | Performed by: EMERGENCY MEDICINE

## 2017-01-01 PROCEDURE — 93005 ELECTROCARDIOGRAM TRACING: CPT | Performed by: EMERGENCY MEDICINE

## 2017-01-01 PROCEDURE — 25010000002 MAGNESIUM SULFATE IN D5W 1G/100ML (PREMIX) 10-5 MG/ML-% SOLUTION: Performed by: INTERNAL MEDICINE

## 2017-01-01 PROCEDURE — 63710000001 INSULIN DETEMIR PER 5 UNITS: Performed by: INTERNAL MEDICINE

## 2017-01-01 PROCEDURE — 81001 URINALYSIS AUTO W/SCOPE: CPT | Performed by: EMERGENCY MEDICINE

## 2017-01-01 PROCEDURE — 99285 EMERGENCY DEPT VISIT HI MDM: CPT

## 2017-01-01 PROCEDURE — 80048 BASIC METABOLIC PNL TOTAL CA: CPT

## 2017-01-01 PROCEDURE — 83605 ASSAY OF LACTIC ACID: CPT | Performed by: EMERGENCY MEDICINE

## 2017-01-01 PROCEDURE — 94640 AIRWAY INHALATION TREATMENT: CPT

## 2017-01-01 PROCEDURE — 82010 KETONE BODYS QUAN: CPT | Performed by: INTERNAL MEDICINE

## 2017-01-01 PROCEDURE — 25010000002 VANCOMYCIN HCL IN NACL 1.25-0.9 GM/250ML-% SOLUTION: Performed by: EMERGENCY MEDICINE

## 2017-01-01 PROCEDURE — 25010000002 LORAZEPAM PER 2 MG: Performed by: NURSE PRACTITIONER

## 2017-01-01 PROCEDURE — 87086 URINE CULTURE/COLONY COUNT: CPT | Performed by: EMERGENCY MEDICINE

## 2017-01-01 PROCEDURE — 82800 BLOOD PH: CPT

## 2017-01-01 PROCEDURE — 82947 ASSAY GLUCOSE BLOOD QUANT: CPT | Performed by: INTERNAL MEDICINE

## 2017-01-01 PROCEDURE — 25810000003 POTASSIUM CHLORIDE PER 2 MEQ: Performed by: INTERNAL MEDICINE

## 2017-01-01 PROCEDURE — P9612 CATHETERIZE FOR URINE SPEC: HCPCS

## 2017-01-01 PROCEDURE — 80053 COMPREHEN METABOLIC PANEL: CPT

## 2017-01-01 PROCEDURE — 87493 C DIFF AMPLIFIED PROBE: CPT | Performed by: INTERNAL MEDICINE

## 2017-01-01 PROCEDURE — 25010000002 MORPHINE PER 10 MG: Performed by: FAMILY MEDICINE

## 2017-01-01 RX ORDER — LORAZEPAM 2 MG/ML
0.5 INJECTION INTRAMUSCULAR EVERY 4 HOURS PRN
Status: DISCONTINUED | OUTPATIENT
Start: 2017-01-01 | End: 2017-01-01 | Stop reason: HOSPADM

## 2017-01-01 RX ORDER — MORPHINE SULFATE 2 MG/ML
1 INJECTION, SOLUTION INTRAMUSCULAR; INTRAVENOUS
Status: DISCONTINUED | OUTPATIENT
Start: 2017-01-01 | End: 2017-01-01 | Stop reason: SDUPTHER

## 2017-01-01 RX ORDER — LEVOTHYROXINE SODIUM ANHYDROUS 100 UG/5ML
25 INJECTION, POWDER, LYOPHILIZED, FOR SOLUTION INTRAVENOUS
Status: DISCONTINUED | OUTPATIENT
Start: 2017-01-01 | End: 2017-01-01

## 2017-01-01 RX ORDER — MORPHINE SULFATE 4 MG/ML
4 INJECTION, SOLUTION INTRAMUSCULAR; INTRAVENOUS ONCE
Status: COMPLETED | OUTPATIENT
Start: 2017-01-01 | End: 2017-01-01

## 2017-01-01 RX ORDER — VANCOMYCIN HYDROCHLORIDE 1 G/200ML
1000 INJECTION, SOLUTION INTRAVENOUS ONCE
Status: COMPLETED | OUTPATIENT
Start: 2017-01-01 | End: 2017-01-01

## 2017-01-01 RX ORDER — FLUCONAZOLE 40 MG/ML
400 POWDER, FOR SUSPENSION ORAL DAILY
Status: DISCONTINUED | OUTPATIENT
Start: 2017-01-01 | End: 2017-01-01 | Stop reason: ALTCHOICE

## 2017-01-01 RX ORDER — DEXTROSE MONOHYDRATE 25 G/50ML
12.5 INJECTION, SOLUTION INTRAVENOUS
Status: DISCONTINUED | OUTPATIENT
Start: 2017-01-01 | End: 2017-01-01

## 2017-01-01 RX ORDER — MORPHINE SULFATE 4 MG/ML
4 INJECTION, SOLUTION INTRAMUSCULAR; INTRAVENOUS EVERY 4 HOURS
Status: DISCONTINUED | OUTPATIENT
Start: 2017-01-01 | End: 2017-01-01

## 2017-01-01 RX ORDER — SODIUM CHLORIDE 9 MG/ML
125 INJECTION, SOLUTION INTRAVENOUS CONTINUOUS
Status: DISCONTINUED | OUTPATIENT
Start: 2017-01-01 | End: 2017-01-01

## 2017-01-01 RX ORDER — FLUCONAZOLE 2 MG/ML
400 INJECTION, SOLUTION INTRAVENOUS EVERY 24 HOURS
Status: DISCONTINUED | OUTPATIENT
Start: 2017-01-01 | End: 2017-01-01

## 2017-01-01 RX ORDER — SODIUM CHLORIDE AND POTASSIUM CHLORIDE 150; 450 MG/100ML; MG/100ML
250 INJECTION, SOLUTION INTRAVENOUS CONTINUOUS PRN
Status: DISCONTINUED | OUTPATIENT
Start: 2017-01-01 | End: 2017-01-01

## 2017-01-01 RX ORDER — POTASSIUM CHLORIDE 7.46 G/1000ML
10 INJECTION, SOLUTION INTRAVENOUS
Status: DISCONTINUED | OUTPATIENT
Start: 2017-01-01 | End: 2017-01-01

## 2017-01-01 RX ORDER — LORAZEPAM 2 MG/ML
0.5 INJECTION INTRAMUSCULAR EVERY 4 HOURS PRN
Status: DISCONTINUED | OUTPATIENT
Start: 2017-01-01 | End: 2017-01-01

## 2017-01-01 RX ORDER — POTASSIUM CHLORIDE 1.5 G/1.77G
40 POWDER, FOR SOLUTION ORAL AS NEEDED
Status: DISCONTINUED | OUTPATIENT
Start: 2017-01-01 | End: 2017-01-01

## 2017-01-01 RX ORDER — IPRATROPIUM BROMIDE AND ALBUTEROL SULFATE 2.5; .5 MG/3ML; MG/3ML
3 SOLUTION RESPIRATORY (INHALATION) ONCE
Status: COMPLETED | OUTPATIENT
Start: 2017-01-01 | End: 2017-01-01

## 2017-01-01 RX ORDER — SODIUM CHLORIDE 450 MG/100ML
250 INJECTION, SOLUTION INTRAVENOUS CONTINUOUS
Status: DISCONTINUED | OUTPATIENT
Start: 2017-01-01 | End: 2017-01-01

## 2017-01-01 RX ORDER — POTASSIUM CHLORIDE 750 MG/1
10 CAPSULE, EXTENDED RELEASE ORAL AS NEEDED
Status: DISCONTINUED | OUTPATIENT
Start: 2017-01-01 | End: 2017-01-01

## 2017-01-01 RX ORDER — MORPHINE SULFATE 4 MG/ML
4 INJECTION, SOLUTION INTRAMUSCULAR; INTRAVENOUS
Status: DISCONTINUED | OUTPATIENT
Start: 2017-01-01 | End: 2017-01-01 | Stop reason: HOSPADM

## 2017-01-01 RX ORDER — GLYCOPYRROLATE 0.2 MG/ML
0.2 INJECTION INTRAMUSCULAR; INTRAVENOUS
Status: DISCONTINUED | OUTPATIENT
Start: 2017-01-01 | End: 2017-01-01

## 2017-01-01 RX ORDER — MORPHINE SULFATE 4 MG/ML
4 INJECTION, SOLUTION INTRAMUSCULAR; INTRAVENOUS
Status: DISCONTINUED | OUTPATIENT
Start: 2017-01-01 | End: 2017-01-01

## 2017-01-01 RX ORDER — KETOROLAC TROMETHAMINE 15 MG/ML
15 INJECTION, SOLUTION INTRAMUSCULAR; INTRAVENOUS EVERY 6 HOURS PRN
Status: DISCONTINUED | OUTPATIENT
Start: 2017-01-01 | End: 2017-01-01 | Stop reason: HOSPADM

## 2017-01-01 RX ORDER — DEXTROSE, SODIUM CHLORIDE, AND POTASSIUM CHLORIDE 5; .45; .15 G/100ML; G/100ML; G/100ML
125 INJECTION INTRAVENOUS CONTINUOUS PRN
Status: DISCONTINUED | OUTPATIENT
Start: 2017-01-01 | End: 2017-01-01

## 2017-01-01 RX ORDER — FLUCONAZOLE 2 MG/ML
200 INJECTION, SOLUTION INTRAVENOUS EVERY 24 HOURS
Status: DISCONTINUED | OUTPATIENT
Start: 2017-01-01 | End: 2017-01-01

## 2017-01-01 RX ORDER — POTASSIUM CHLORIDE 1.5 G/1.77G
10 POWDER, FOR SOLUTION ORAL AS NEEDED
Status: DISCONTINUED | OUTPATIENT
Start: 2017-01-01 | End: 2017-01-01

## 2017-01-01 RX ORDER — GLYCOPYRROLATE 0.2 MG/ML
0.2 INJECTION INTRAMUSCULAR; INTRAVENOUS
Status: DISCONTINUED | OUTPATIENT
Start: 2017-01-01 | End: 2017-01-01 | Stop reason: HOSPADM

## 2017-01-01 RX ORDER — POTASSIUM CHLORIDE 750 MG/1
20 CAPSULE, EXTENDED RELEASE ORAL AS NEEDED
Status: DISCONTINUED | OUTPATIENT
Start: 2017-01-01 | End: 2017-01-01

## 2017-01-01 RX ORDER — LORAZEPAM 2 MG/ML
2 INJECTION INTRAMUSCULAR
Status: DISCONTINUED | OUTPATIENT
Start: 2017-01-01 | End: 2017-01-01

## 2017-01-01 RX ORDER — POTASSIUM CHLORIDE 750 MG/1
40 CAPSULE, EXTENDED RELEASE ORAL AS NEEDED
Status: DISCONTINUED | OUTPATIENT
Start: 2017-01-01 | End: 2017-01-01

## 2017-01-01 RX ORDER — MORPHINE SULFATE 4 MG/ML
4 INJECTION, SOLUTION INTRAMUSCULAR; INTRAVENOUS EVERY 4 HOURS
Status: DISCONTINUED | OUTPATIENT
Start: 2017-01-01 | End: 2017-01-01 | Stop reason: HOSPADM

## 2017-01-01 RX ORDER — SODIUM CHLORIDE 0.9 % (FLUSH) 0.9 %
10 SYRINGE (ML) INJECTION AS NEEDED
Status: DISCONTINUED | OUTPATIENT
Start: 2017-01-01 | End: 2017-01-01 | Stop reason: HOSPADM

## 2017-01-01 RX ORDER — POTASSIUM CHLORIDE 1.5 G/1.77G
20 POWDER, FOR SOLUTION ORAL AS NEEDED
Status: DISCONTINUED | OUTPATIENT
Start: 2017-01-01 | End: 2017-01-01

## 2017-01-01 RX ORDER — VANCOMYCIN HYDROCHLORIDE
1250 ONCE
Status: DISCONTINUED | OUTPATIENT
Start: 2017-01-01 | End: 2017-01-01

## 2017-01-01 RX ORDER — MORPHINE SULFATE 2 MG/ML
2 INJECTION, SOLUTION INTRAMUSCULAR; INTRAVENOUS
Status: DISCONTINUED | OUTPATIENT
Start: 2017-01-01 | End: 2017-01-01

## 2017-01-01 RX ORDER — HEPARIN SODIUM 5000 [USP'U]/ML
5000 INJECTION, SOLUTION INTRAVENOUS; SUBCUTANEOUS EVERY 8 HOURS SCHEDULED
Status: DISCONTINUED | OUTPATIENT
Start: 2017-01-01 | End: 2017-01-01

## 2017-01-01 RX ORDER — VANCOMYCIN HYDROCHLORIDE
20 ONCE
Status: COMPLETED | OUTPATIENT
Start: 2017-01-01 | End: 2017-01-01

## 2017-01-01 RX ORDER — DEXTROSE AND SODIUM CHLORIDE 5; .45 G/100ML; G/100ML
150 INJECTION, SOLUTION INTRAVENOUS CONTINUOUS PRN
Status: DISCONTINUED | OUTPATIENT
Start: 2017-01-01 | End: 2017-01-01

## 2017-01-01 RX ORDER — FAMOTIDINE 10 MG/ML
20 INJECTION, SOLUTION INTRAVENOUS DAILY
Status: DISCONTINUED | OUTPATIENT
Start: 2017-01-01 | End: 2017-01-01 | Stop reason: HOSPADM

## 2017-01-01 RX ORDER — HEPARIN SODIUM 5000 [USP'U]/ML
5000 INJECTION, SOLUTION INTRAVENOUS; SUBCUTANEOUS EVERY 12 HOURS SCHEDULED
Status: DISCONTINUED | OUTPATIENT
Start: 2017-01-01 | End: 2017-01-01

## 2017-01-01 RX ORDER — LORAZEPAM 2 MG/ML
2 INJECTION INTRAMUSCULAR
Status: DISCONTINUED | OUTPATIENT
Start: 2017-01-01 | End: 2017-01-01 | Stop reason: HOSPADM

## 2017-01-01 RX ORDER — IPRATROPIUM BROMIDE AND ALBUTEROL SULFATE 2.5; .5 MG/3ML; MG/3ML
3 SOLUTION RESPIRATORY (INHALATION) EVERY 4 HOURS PRN
Status: DISCONTINUED | OUTPATIENT
Start: 2017-01-01 | End: 2017-01-01 | Stop reason: HOSPADM

## 2017-01-01 RX ADMIN — METRONIDAZOLE 500 MG: 500 INJECTION, SOLUTION INTRAVENOUS at 08:13

## 2017-01-01 RX ADMIN — DEXTROSE AND SODIUM CHLORIDE 150 ML/HR: 5; 450 INJECTION, SOLUTION INTRAVENOUS at 22:39

## 2017-01-01 RX ADMIN — Medication 0.02 MCG/KG/MIN: at 08:19

## 2017-01-01 RX ADMIN — FAMOTIDINE 20 MG: 10 INJECTION, SOLUTION INTRAVENOUS at 09:19

## 2017-01-01 RX ADMIN — FAMOTIDINE 20 MG: 10 INJECTION, SOLUTION INTRAVENOUS at 08:00

## 2017-01-01 RX ADMIN — MORPHINE SULFATE 4 MG: 4 INJECTION, SOLUTION INTRAMUSCULAR; INTRAVENOUS at 10:35

## 2017-01-01 RX ADMIN — POTASSIUM CHLORIDE, DEXTROSE MONOHYDRATE AND SODIUM CHLORIDE 150 ML/HR: 150; 5; 450 INJECTION, SOLUTION INTRAVENOUS at 15:43

## 2017-01-01 RX ADMIN — MORPHINE SULFATE 2 MG: 2 INJECTION, SOLUTION INTRAMUSCULAR; INTRAVENOUS at 00:05

## 2017-01-01 RX ADMIN — MEROPENEM 1 G: 1 INJECTION, POWDER, FOR SOLUTION INTRAVENOUS at 20:54

## 2017-01-01 RX ADMIN — LORAZEPAM 0.5 MG: 2 INJECTION INTRAMUSCULAR; INTRAVENOUS at 10:22

## 2017-01-01 RX ADMIN — HEPARIN SODIUM 5000 UNITS: 5000 INJECTION, SOLUTION INTRAVENOUS; SUBCUTANEOUS at 14:06

## 2017-01-01 RX ADMIN — METRONIDAZOLE 500 MG: 500 INJECTION, SOLUTION INTRAVENOUS at 04:22

## 2017-01-01 RX ADMIN — POTASSIUM PHOSPHATE, MONOBASIC AND POTASSIUM PHOSPHATE, DIBASIC 45 MMOL: 224; 236 INJECTION, SOLUTION INTRAVENOUS at 13:15

## 2017-01-01 RX ADMIN — METRONIDAZOLE 500 MG: 500 INJECTION, SOLUTION INTRAVENOUS at 13:14

## 2017-01-01 RX ADMIN — SODIUM CHLORIDE 50 MG: 9 INJECTION, SOLUTION INTRAVENOUS at 14:34

## 2017-01-01 RX ADMIN — LEVOTHYROXINE SODIUM ANHYDROUS 25 MCG: 100 INJECTION, POWDER, LYOPHILIZED, FOR SOLUTION INTRAVENOUS at 11:40

## 2017-01-01 RX ADMIN — MORPHINE SULFATE 1 MG: 2 INJECTION, SOLUTION INTRAMUSCULAR; INTRAVENOUS at 23:11

## 2017-01-01 RX ADMIN — POTASSIUM CHLORIDE, DEXTROSE MONOHYDRATE AND SODIUM CHLORIDE 150 ML/HR: 150; 5; 450 INJECTION, SOLUTION INTRAVENOUS at 15:48

## 2017-01-01 RX ADMIN — MORPHINE SULFATE 2 MG: 2 INJECTION, SOLUTION INTRAMUSCULAR; INTRAVENOUS at 21:11

## 2017-01-01 RX ADMIN — POTASSIUM CHLORIDE 10 MEQ: 7.46 INJECTION, SOLUTION INTRAVENOUS at 04:22

## 2017-01-01 RX ADMIN — POTASSIUM CHLORIDE, DEXTROSE MONOHYDRATE AND SODIUM CHLORIDE 125 ML/HR: 150; 5; 450 INJECTION, SOLUTION INTRAVENOUS at 23:07

## 2017-01-01 RX ADMIN — METRONIDAZOLE 500 MG: 500 INJECTION, SOLUTION INTRAVENOUS at 20:49

## 2017-01-01 RX ADMIN — METRONIDAZOLE 500 MG: 500 INJECTION, SOLUTION INTRAVENOUS at 06:09

## 2017-01-01 RX ADMIN — KETOROLAC TROMETHAMINE 15 MG: 15 INJECTION, SOLUTION INTRAMUSCULAR; INTRAVENOUS at 08:01

## 2017-01-01 RX ADMIN — HEPARIN SODIUM 5000 UNITS: 5000 INJECTION, SOLUTION INTRAVENOUS; SUBCUTANEOUS at 10:35

## 2017-01-01 RX ADMIN — MORPHINE SULFATE 4 MG: 4 INJECTION, SOLUTION INTRAMUSCULAR; INTRAVENOUS at 14:35

## 2017-01-01 RX ADMIN — FLUCONAZOLE 400 MG: 2 INJECTION INTRAVENOUS at 21:09

## 2017-01-01 RX ADMIN — SODIUM CHLORIDE 250 ML/HR: 4.5 INJECTION, SOLUTION INTRAVENOUS at 11:09

## 2017-01-01 RX ADMIN — SODIUM CHLORIDE 50 MG: 9 INJECTION, SOLUTION INTRAVENOUS at 02:43

## 2017-01-01 RX ADMIN — MEROPENEM 1 G: 1 INJECTION, POWDER, FOR SOLUTION INTRAVENOUS at 20:33

## 2017-01-01 RX ADMIN — MAGNESIUM SULFATE HEPTAHYDRATE 1 G: 1 INJECTION, SOLUTION INTRAVENOUS at 08:17

## 2017-01-01 RX ADMIN — SODIUM CHLORIDE 0.1 UNITS/KG/HR: 9 INJECTION, SOLUTION INTRAVENOUS at 10:35

## 2017-01-01 RX ADMIN — MEROPENEM 1 G: 1 INJECTION, POWDER, FOR SOLUTION INTRAVENOUS at 10:42

## 2017-01-01 RX ADMIN — HEPARIN SODIUM 5000 UNITS: 5000 INJECTION, SOLUTION INTRAVENOUS; SUBCUTANEOUS at 06:03

## 2017-01-01 RX ADMIN — HEPARIN SODIUM 5000 UNITS: 5000 INJECTION, SOLUTION INTRAVENOUS; SUBCUTANEOUS at 21:49

## 2017-01-01 RX ADMIN — FAMOTIDINE 20 MG: 10 INJECTION, SOLUTION INTRAVENOUS at 08:01

## 2017-01-01 RX ADMIN — POTASSIUM CHLORIDE 10 MEQ: 7.46 INJECTION, SOLUTION INTRAVENOUS at 06:49

## 2017-01-01 RX ADMIN — POTASSIUM CHLORIDE 10 MEQ: 7.46 INJECTION, SOLUTION INTRAVENOUS at 02:21

## 2017-01-01 RX ADMIN — MAGNESIUM SULFATE HEPTAHYDRATE 1 G: 1 INJECTION, SOLUTION INTRAVENOUS at 06:24

## 2017-01-01 RX ADMIN — POTASSIUM CHLORIDE, DEXTROSE MONOHYDRATE AND SODIUM CHLORIDE 150 ML/HR: 150; 5; 450 INJECTION, SOLUTION INTRAVENOUS at 08:42

## 2017-01-01 RX ADMIN — HEPARIN SODIUM 5000 UNITS: 5000 INJECTION, SOLUTION INTRAVENOUS; SUBCUTANEOUS at 05:16

## 2017-01-01 RX ADMIN — SODIUM CHLORIDE 100 MG: 9 INJECTION, SOLUTION INTRAVENOUS at 14:43

## 2017-01-01 RX ADMIN — HEPARIN SODIUM 5000 UNITS: 5000 INJECTION, SOLUTION INTRAVENOUS; SUBCUTANEOUS at 08:15

## 2017-01-01 RX ADMIN — MICAFUNGIN SODIUM 100 MG: 20 INJECTION, POWDER, LYOPHILIZED, FOR SOLUTION INTRAVENOUS at 11:18

## 2017-01-01 RX ADMIN — MORPHINE SULFATE 1 MG: 2 INJECTION, SOLUTION INTRAMUSCULAR; INTRAVENOUS at 21:10

## 2017-01-01 RX ADMIN — IPRATROPIUM BROMIDE AND ALBUTEROL SULFATE 3 ML: .5; 3 SOLUTION RESPIRATORY (INHALATION) at 11:34

## 2017-01-01 RX ADMIN — MORPHINE SULFATE 2 MG: 2 INJECTION, SOLUTION INTRAMUSCULAR; INTRAVENOUS at 10:05

## 2017-01-01 RX ADMIN — SODIUM CHLORIDE 1000 ML: 9 INJECTION, SOLUTION INTRAVENOUS at 10:35

## 2017-01-01 RX ADMIN — FLUCONAZOLE 200 MG: 2 INJECTION INTRAVENOUS at 21:12

## 2017-01-01 RX ADMIN — POTASSIUM CHLORIDE AND SODIUM CHLORIDE 250 ML/HR: 450; 150 INJECTION, SOLUTION INTRAVENOUS at 16:35

## 2017-01-01 RX ADMIN — METRONIDAZOLE 500 MG: 500 INJECTION, SOLUTION INTRAVENOUS at 21:48

## 2017-01-01 RX ADMIN — SODIUM CHLORIDE 8.8 UNITS/HR: 9 INJECTION, SOLUTION INTRAVENOUS at 08:42

## 2017-01-01 RX ADMIN — FAMOTIDINE 20 MG: 10 INJECTION, SOLUTION INTRAVENOUS at 08:13

## 2017-01-01 RX ADMIN — METRONIDAZOLE 500 MG: 500 INJECTION, SOLUTION INTRAVENOUS at 14:06

## 2017-01-01 RX ADMIN — FAMOTIDINE 20 MG: 10 INJECTION, SOLUTION INTRAVENOUS at 08:16

## 2017-01-01 RX ADMIN — POTASSIUM CHLORIDE, DEXTROSE MONOHYDRATE AND SODIUM CHLORIDE 150 ML/HR: 150; 5; 450 INJECTION, SOLUTION INTRAVENOUS at 04:01

## 2017-01-01 RX ADMIN — INSULIN DETEMIR 8 UNITS: 100 INJECTION, SOLUTION SUBCUTANEOUS at 20:49

## 2017-01-01 RX ADMIN — HEPARIN SODIUM 5000 UNITS: 5000 INJECTION, SOLUTION INTRAVENOUS; SUBCUTANEOUS at 21:10

## 2017-01-01 RX ADMIN — LEVOTHYROXINE SODIUM ANHYDROUS 25 MCG: 100 INJECTION, POWDER, LYOPHILIZED, FOR SOLUTION INTRAVENOUS at 10:40

## 2017-01-01 RX ADMIN — LEVOTHYROXINE SODIUM ANHYDROUS 25 MCG: 100 INJECTION, POWDER, LYOPHILIZED, FOR SOLUTION INTRAVENOUS at 11:18

## 2017-01-01 RX ADMIN — SODIUM PHOSPHATE, MONOBASIC, MONOHYDRATE 30 MMOL: 276; 142 INJECTION, SOLUTION INTRAVENOUS at 03:27

## 2017-01-01 RX ADMIN — POTASSIUM CHLORIDE, DEXTROSE MONOHYDRATE AND SODIUM CHLORIDE 125 ML/HR: 150; 5; 450 INJECTION, SOLUTION INTRAVENOUS at 08:56

## 2017-01-01 RX ADMIN — HEPARIN SODIUM 5000 UNITS: 5000 INJECTION, SOLUTION INTRAVENOUS; SUBCUTANEOUS at 13:26

## 2017-01-01 RX ADMIN — KETOROLAC TROMETHAMINE 15 MG: 15 INJECTION, SOLUTION INTRAMUSCULAR; INTRAVENOUS at 13:18

## 2017-01-01 RX ADMIN — POTASSIUM CHLORIDE, DEXTROSE MONOHYDRATE AND SODIUM CHLORIDE 150 ML/HR: 150; 5; 450 INJECTION, SOLUTION INTRAVENOUS at 21:06

## 2017-01-01 RX ADMIN — MAGNESIUM SULFATE HEPTAHYDRATE 1 G: 1 INJECTION, SOLUTION INTRAVENOUS at 05:16

## 2017-01-01 RX ADMIN — SODIUM CHLORIDE 0.75 UNITS/HR: 9 INJECTION, SOLUTION INTRAVENOUS at 08:41

## 2017-01-01 RX ADMIN — FAMOTIDINE 20 MG: 10 INJECTION, SOLUTION INTRAVENOUS at 10:35

## 2017-01-01 RX ADMIN — SODIUM CHLORIDE 1000 ML: 9 INJECTION, SOLUTION INTRAVENOUS at 06:52

## 2017-01-01 RX ADMIN — MORPHINE SULFATE 2 MG: 2 INJECTION, SOLUTION INTRAMUSCULAR; INTRAVENOUS at 13:18

## 2017-01-01 RX ADMIN — LEVOTHYROXINE SODIUM ANHYDROUS 25 MCG: 100 INJECTION, POWDER, LYOPHILIZED, FOR SOLUTION INTRAVENOUS at 11:54

## 2017-01-01 RX ADMIN — POTASSIUM CHLORIDE AND SODIUM CHLORIDE 250 ML/HR: 450; 150 INJECTION, SOLUTION INTRAVENOUS at 20:33

## 2017-01-01 RX ADMIN — VANCOMYCIN HYDROCHLORIDE 1250 MG: 1 INJECTION, POWDER, LYOPHILIZED, FOR SOLUTION INTRAVENOUS at 07:44

## 2017-01-01 RX ADMIN — HEPARIN SODIUM 5000 UNITS: 5000 INJECTION, SOLUTION INTRAVENOUS; SUBCUTANEOUS at 20:50

## 2017-01-01 RX ADMIN — MEROPENEM 1 G: 1 INJECTION, POWDER, FOR SOLUTION INTRAVENOUS at 09:49

## 2017-01-01 RX ADMIN — HEPARIN SODIUM 5000 UNITS: 5000 INJECTION, SOLUTION INTRAVENOUS; SUBCUTANEOUS at 14:34

## 2017-01-01 RX ADMIN — VANCOMYCIN HYDROCHLORIDE 1000 MG: 1 INJECTION, SOLUTION INTRAVENOUS at 08:15

## 2017-01-01 RX ADMIN — MORPHINE SULFATE 1 MG: 2 INJECTION, SOLUTION INTRAMUSCULAR; INTRAVENOUS at 18:05

## 2017-01-01 RX ADMIN — Medication: at 08:01

## 2017-01-01 RX ADMIN — LEVOTHYROXINE SODIUM ANHYDROUS 25 MCG: 100 INJECTION, POWDER, LYOPHILIZED, FOR SOLUTION INTRAVENOUS at 13:15

## 2017-01-01 RX ADMIN — SODIUM CHLORIDE 50 MG: 9 INJECTION, SOLUTION INTRAVENOUS at 01:49

## 2017-01-01 RX ADMIN — POTASSIUM CHLORIDE, DEXTROSE MONOHYDRATE AND SODIUM CHLORIDE 150 ML/HR: 150; 5; 450 INJECTION, SOLUTION INTRAVENOUS at 22:51

## 2017-01-01 RX ADMIN — INSULIN HUMAN 10 UNITS: 100 INJECTION, SOLUTION PARENTERAL at 08:24

## 2017-01-01 RX ADMIN — INSULIN DETEMIR 8 UNITS: 100 INJECTION, SOLUTION SUBCUTANEOUS at 21:29

## 2017-01-01 RX ADMIN — METRONIDAZOLE 500 MG: 500 INJECTION, SOLUTION INTRAVENOUS at 13:26

## 2017-01-01 RX ADMIN — LEVOTHYROXINE SODIUM ANHYDROUS 25 MCG: 100 INJECTION, POWDER, LYOPHILIZED, FOR SOLUTION INTRAVENOUS at 12:01

## 2017-01-01 RX ADMIN — SODIUM CHLORIDE 2040 ML: 9 INJECTION, SOLUTION INTRAVENOUS at 07:20

## 2017-01-01 RX ADMIN — MAGNESIUM SULFATE HEPTAHYDRATE 1 G: 1 INJECTION, SOLUTION INTRAVENOUS at 10:42

## 2017-01-01 RX ADMIN — INSULIN HUMAN 6.8 UNITS: 100 INJECTION, SOLUTION PARENTERAL at 10:40

## 2017-01-01 RX ADMIN — SODIUM CHLORIDE 1000 ML: 9 INJECTION, SOLUTION INTRAVENOUS at 07:44

## 2017-01-01 RX ADMIN — AZTREONAM 2 G: 2 INJECTION, POWDER, LYOPHILIZED, FOR SOLUTION INTRAMUSCULAR; INTRAVENOUS at 10:25

## 2017-01-01 RX ADMIN — POTASSIUM CHLORIDE, DEXTROSE MONOHYDRATE AND SODIUM CHLORIDE 150 ML/HR: 150; 5; 450 INJECTION, SOLUTION INTRAVENOUS at 06:24

## 2017-01-01 RX ADMIN — METRONIDAZOLE 500 MG: 500 INJECTION, SOLUTION INTRAVENOUS at 20:19

## 2017-01-01 RX ADMIN — HEPARIN SODIUM 5000 UNITS: 5000 INJECTION, SOLUTION INTRAVENOUS; SUBCUTANEOUS at 20:33

## 2017-01-04 NOTE — TELEPHONE ENCOUNTER
Cherie berg nurse from [facility] called. She stated the pt still has her staples in. Wanted further directions. Also, states she needs an order for PT/OT. Spoke with Dr. Mcneil after she and I reviewed the chart. There is nothing that indicates that the staples cannot be removed and the standard is 7-10 days. Advised it is okay to remove and it is fine to give the PT/OT order and to fax it over so Dr. Mcneil can sign it. Nurse verbalized understanding.

## 2017-01-06 NOTE — TELEPHONE ENCOUNTER
VICKIE WAS ADVISED PER VO FROM  TO GIVE PT 10 UNITS NOW AND CALL BACK WITH SUGARS, ADVISED IT WOULD PROBABLY BE AFTER HOURS AND SHE WILL NEED TO CALL THE ON CALL , AND TO CONTINUE WITH 5 UNITS AT BEDTIME.

## 2017-01-06 NOTE — TELEPHONE ENCOUNTER
----- Message from Adeola Lobato sent at 1/6/2017 11:43 AM EST -----  Janelle Lezama is calling, she would like Mary to call the nurse Cherie  at the [facility] about the patient. Her sugar has been going up to 400. Cherie can be called at [phone].

## 2017-01-08 PROBLEM — E08.11 DIABETIC KETOACIDOSIS WITH COMA ASSOCIATED WITH DIABETES MELLITUS DUE TO UNDERLYING CONDITION (HCC): Status: ACTIVE | Noted: 2017-01-01

## 2017-01-08 PROBLEM — R57.9 SHOCK (HCC): Status: ACTIVE | Noted: 2017-01-01

## 2017-01-08 PROBLEM — N17.9 ACUTE RENAL FAILURE (HCC): Status: ACTIVE | Noted: 2017-01-01

## 2017-01-08 PROBLEM — E87.20 METABOLIC ACIDOSIS: Status: ACTIVE | Noted: 2017-01-01

## 2017-01-08 NOTE — PLAN OF CARE
Problem: Patient Care Overview (Adult)  Goal: Plan of Care Review  Outcome: Ongoing (interventions implemented as appropriate)    01/08/17 4898   Coping/Psychosocial Response Interventions   Plan Of Care Reviewed With caregiver   Patient Care Overview   Progress no change   Outcome Evaluation   Outcome Summary/Follow up Plan New palliative consult. MARGIE Lopez met with Janelle, sister and she wants to continue current level of care but do not esculate care. She is DNR

## 2017-01-08 NOTE — H&P
CRITICAL CARE ADMISSION NOTE    Chief Complaint     Shock    History of Present Illness     66-year-old white brought to the emergency room from her nursing home for decreased level of consciousness.    She was just in the emergency room a couple days ago.  At that point her labs were relatively unremarkable.    Today she is markedly acidotic, hyperglycemic, and obtunded.  Urine grossly appears infected.    She has hypercalcemia, hyperglycemia, and acute renal failure.    She is obtunded and unable to contribute to her history.    In the emergency room she is received IV fluids, empiric antibiotics and insulin.  We've been asked to undertake her care in the intensive care unit    Problem List, Surgical History, Family, Social History, and ROS     Patient Active Problem List   Diagnosis   • Abnormal weight loss   • Alzheimer's disease   • Depression   • Hyperlipidemia   • Essential hypertension   • Hypoglycemia   • Hypothyroidism   • Parkinson's disease   • Type 2 diabetes mellitus   • Increased frequency of urination   • UTI (urinary tract infection)   • Leukocytosis   • Lactic acidosis   • Acute metabolic encephalopathy   • Somnolence   • Hyperkalemia   • Hyperglycemia   • Scalp laceration   • Fall   • Anemia   • Sepsis   • Lactic acidosis   • Shock (Hypovolemic, Septic)   • Acute renal failure (R/O due to hypovolemia)   • Metabolic acidosis (DKA and Lactic)   • Diabetic ketoacidosis with coma associated with diabetes mellitus due to underlying condition     Past Surgical History   Procedure Laterality Date   • Cataract extraction     • Oophorectomy     • Hysterectomy         Allergies   Allergen Reactions   • Penicillins      No current facility-administered medications on file prior to encounter.      Current Outpatient Prescriptions on File Prior to Encounter   Medication Sig   • atorvastatin (LIPITOR) 40 MG tablet Take 40 mg by mouth Daily.   • carbidopa-levodopa (SINEMET)  MG per tablet Take 1.5  "tablets by mouth 3 (Three) Times a Day.   • donepezil (ARICEPT) 10 MG tablet Take 10 mg by mouth Daily.   • Insulin Glargine (LANTUS SOLOSTAR) 100 UNIT/ML injection pen Inject 5 Units under the skin Every Night.   • levothyroxine (SYNTHROID, LEVOTHROID) 50 MCG tablet Take 50 mcg by mouth Daily.   • lisinopril (PRINIVIL,ZESTRIL) 5 MG tablet Take 1 tablet by mouth Daily.   • memantine (NAMENDA XR) 28 MG capsule sustained-release 24 hr extended release capsule Take 28 mg by mouth Daily.   • NOVOLOG FLEXPEN 100 UNIT/ML solution pen-injector sc pen Per Sliding Scale   • PARoxetine (PAXIL) 30 MG tablet Take 30 mg by mouth Daily.   • QUEtiapine (SEROquel) 25 MG tablet Take 0.5 tablets by mouth Every Night.     MEDICATION LIST AND ALLERGIES REVIEWED.    Family History   Problem Relation Age of Onset   • Alzheimer's disease Mother    • Hypertension Mother    • Heart disease Mother      Ischemic   • Migraines Mother    • Diabetes Mother      type 2   • Alcohol abuse Father    • Hypertension Father    • Hyperlipidemia Sister    • Hypertension Sister    • Osteoporosis Sister      Social History   Substance Use Topics   • Smoking status: Never Smoker   • Smokeless tobacco: Never Used   • Alcohol use No     Social History     Social History Narrative    Lives in memory care     FAMILY AND SOCIAL HISTORY REVIEWED.    Review of Systems  PATIENT UNABLE TO CONTRIBUTE, ALL OTHER SYSTEMS REVIEWED AND ARE NEGATIVE.    Physical Exam and Clinical Information     Visit Vitals   • BP (!) 65/28   • Pulse 84   • Temp 97.5 °F (36.4 °C) (Rectal)   • Resp (!) 30   • Ht 68\" (172.7 cm)   • Wt 150 lb (68 kg)   • SpO2 100%   • BMI 22.81 kg/m2     Physical Exam:   GENERAL: Obtunded, tachypneic, no distress   HEENT: No scleral icterus.  Pupils are sluggishly reactive and equal.  No adenopathy   LUNGS: Coarse breath sounds bilaterally without wheezes   HEART: Regular tachycardia.  Grade 2/6 systolic murmur   ABDOMEN: Quiet.  Slightly distended.  No " bowel sounds   EXTREMITIES: No peripheral edema.  Trace palpable pulses.  No cyanosis   NEURO/PSYCH: Obtunded and unresponsive.  We'll withdraw all fours to painful stimuli      Results from last 7 days  Lab Units 01/08/17  0654 01/02/17  0418   WBC 10*3/mm3 17.57* 6.35   HEMOGLOBIN g/dL 10.1* 10.8*   PLATELETS 10*3/mm3 448 446       Results from last 7 days  Lab Units 01/08/17  0654 01/02/17  0418   SODIUM mmol/L 136 138   POTASSIUM mmol/L 6.6* 4.1   TOTAL CO2 mmol/L <10.0* 24.0   BUN mg/dL 50* 22   CREATININE mg/dL 2.50* 0.90   MAGNESIUM mg/dL 2.7  --    GLUCOSE mg/dL 1052* 329*     Estimated Creatinine Clearance: 22 mL/min (by C-G formula based on Cr of 2.5).        Results from last 7 days  Lab Units 01/08/17  0742   PH, ARTERIAL pH units 6.838*   PCO2, ARTERIAL mm Hg 15.3*   PO2 ART mm Hg 134.0*     Lab Results   Component Value Date    LACTATE 1.9 12/24/2016        IMAGES: Portable chest x-rays clear without consolidation or effusions    I reviewed the patient's results and images.     [facility]     Hospital Problem List     * (Principal)Shock (Hypovolemic, Septic)    UTI (urinary tract infection)    Hyperkalemia    Hyperglycemia    Sepsis    Acute renal failure (R/O due to hypovolemia)    Metabolic acidosis (DKA and Lactic)    Parkinson's disease    Acute metabolic encephalopathy    Alzheimer's disease    Hypothyroidism    Diabetic ketoacidosis with coma associated with diabetes mellitus due to underlying condition        Plan/Recommendations     Admit to the intensive care unit  IV fluids  Insulin  Electrolyte replacement as necessary  Continue thyroid replacement IV  DVT and ulcer prophylaxis    Discussed with her sister at the bedside who is her POA, we will not pursue extensive resuscitative measures in the event of a cardiopulmonary arrest.    Critical Care time spent in direct patient care: 45 minutes (excluding procedure time, if applicable) including high complexity decision making to assess,  manipulate, and support vital organ system failure in this individual who has impairment of one or more vital organ systems such that there is a high probability of imminent or life threatening deterioration in the patient’s condition.    MARICRUZ Kent MD  Pulmonary and Critical Care Medicine  01/08/17 9:38 AM     CC: Yumiko Mcneil, DO    Please note that portions of this note were completed with a voice recognition program. Efforts were made to edit the dictations, but occasionally words are mistranscribed.

## 2017-01-08 NOTE — PROGRESS NOTES
Pharmacokinetic Consult - Vancomycin Dosing    Adrianna Casiano is a 66 y.o. female in whom pharmacy has been consulted to dose Vancomycin for septic shock.    Relevant clinical data and objective history reviewed:  CREATININE   Date Value Ref Range Status   01/08/2017 2.60 (H) 0.60 - 1.30 mg/dL Final   01/08/2017 2.50 (H) 0.60 - 1.30 mg/dL Final   01/08/2017 2.50 (H) 0.60 - 1.30 mg/dL Final   11/21/2016 1.20 0.60 - 1.30 mg/dL Final   03/19/2016 0.7 0.6 - 1.3 mg/dL Final   02/28/2016 0.8 0.6 - 1.3 mg/dL Final   02/08/2016 0.8 0.6 - 1.3 mg/dL Final     BUN   Date Value Ref Range Status   01/08/2017 42 (H) 9 - 23 mg/dL Final   01/08/2017 46 (H) 9 - 23 mg/dL Final   01/08/2017 50 (H) 9 - 23 mg/dL Final   03/19/2016 13 9 - 23 mg/dL Final   02/28/2016 16 9 - 23 mg/dL Final   02/08/2016 12 9 - 23 mg/dL Final     Estimated Creatinine Clearance: 21.2 mL/min (by C-G formula based on Cr of 2.6).     Lab Results   Component Value Date/Time    WBC 19.67 (H) 01/08/2017 10:09 AM    WBC 8.81 03/19/2016 07:33 AM    HGB 8.8 (L) 01/08/2017 10:09 AM    HGB 11.8 03/19/2016 07:33 AM    HCT 30.5 (L) 01/08/2017 10:09 AM    HCT 35.4 03/19/2016 07:33 AM    .9 (H) 01/08/2017 10:09 AM    MCV 92.4 03/19/2016 07:33 AM     01/08/2017 10:09 AM     03/19/2016 07:33 AM     Temp Readings from Last 3 Encounters:   01/08/17 96.4 °F (35.8 °C) (Axillary)   01/03/17 98 °F (36.7 °C) (Axillary)   11/28/16 98.6 °F (37 °C) (Axillary)     Weight: 150 lb (68 kg)      Assessment/Plan  The patient received 1.25 mg (18 mg/kg) Vancomycin in the ER at 0744.  Based on acutely elevated SCr, will check a random Vancomycin level tomorrow AM and redose based on level and renal clearance.  Thanks,     Maria Guadalupe MedleyD

## 2017-01-08 NOTE — PROGRESS NOTES
Discharge Planning Assessment  [facility]     Patient Name: Adrianna Casiano  MRN: [id number]  Today's Date: 1/8/2017    Admit Date: 1/8/2017          Discharge Needs Assessment       01/08/17 1033    Living Environment    Provides Primary Care For no one, unable/limited ability to care for self    Primary Care Provided By other (see comments)    Quality Of Family Relationships unable to assess    Discharge Needs Assessment    Concerns To Be Addressed no discharge needs identified    Anticipated Changes Related to Illness inability to care for self    Discharge Contact Information if Applicable Joi Lezama, sister, [phone]    Discharge Planning Comments Pt resides at [facility] in [place]            Discharge Plan     None        Discharge Placement     No information found                Demographic Summary     None            Functional Status       01/08/17 1042    Functional Status Prior    Communication 0-->understands/communicates without difficulty    Swallowing 0-->swallows foods/liquids without difficulty    Activity Tolerance    Current Activity Tolerance poor    Cognitive/Perceptual/Developmental    Current Mental Status/Cognitive Functioning unable to assess    Recent Changes in Mental Status/Cognitive Functioning memory (recent)    Developmental Stage (Eriksson's Stages of Development) Stage 8 (65 years-death/Late Adulthood) Integrity vs. Despair    Employment/Financial    Employment/Finance Comments [employer] secondary      01/08/17 1028    Functional Status Current    Ambulation 3-->assistive equipment and person    Transferring 3-->assistive equipment and person    Toileting 3-->assistive equipment and person    Bathing 2-->assistive person    Dressing 2-->assistive person    Eating 2-->assistive person    Communication 0-->understands/communicates without difficulty    Swallowing (if score 2 or more for any item, consult Rehab Services) 0-->swallows foods/liquids without  difficulty    Change in Functional Status Since Onset of Current Illness/Injury yes    Functional Status Prior    Ambulation 0-->independent    Transferring 0-->independent    Toileting 0-->independent    Bathing 0-->independent    Dressing 0-->independent    Eating 0-->independent    Prior Functional Level Comment --    IADL    Medications assistive person    Meal Preparation assistive person    Housekeeping assistive person    Laundry assistive person    Shopping assistive person    Oral Care assistive person    Employment/Financial    Employment/Finance Comments [facility]            Psychosocial     None            Abuse/Neglect     None            Legal     None            Substance Abuse     None            Patient Forms     None          Clarisa Garcia

## 2017-01-08 NOTE — CONSULTS
Yumiko Mcneil DO  Consulting physician: Gato Kent  Reason for referral : goals of care discussion, symptom management with comfort  Chief Complaint   Patient presents with   • Hyperglycemia   HPI:  67 yo female with history of Parkinson's dementia, Alzheimers dementia, DM type 2 transferred from [facility] to ED early this am obtunded, hypotensive, hyperglycemic, hypercalcemic with UTI.   Found to have metabolic acidosis, lactic 8.8 and DKA; shock; sepsis and hypovolemic.   Patient's sister, Janelle Lezama, is caregiver and poa. Decision has been made to avoid any aggressive or invasive interventions.   Palliative medicine was consulted to further assist with support with recognition that this may not be a survivable event.     Goals of care discussion: Patient's other sister, Ana, is on her way from [place], but will not arrive until late afternoon on Monday.   Lacy understands that this may be a non survivable event. The other sister has just had a her  die and is wanting very much to be able to get to the bedside for the patient and herself.     Symptoms: Unobtainable.   Sister reports that patient has had a decline in functional status over the last several weeks, FAST 7c.   Past Medical History   Diagnosis Date   • Acidosis    • Anemia    • Dementia    • Depression    • Diabetes mellitus    • Fall    • HARLAN (generalized anxiety disorder)    • Hyperkalemia    • Hyperlipidemia    • Hypertension    • Hypothyroidism    • Parkinson disease    • Sepsis    • UTI (urinary tract infection)      Past Surgical History   Procedure Laterality Date   • Cataract extraction     • Oophorectomy     • Hysterectomy       Current Facility-Administered Medications   Medication Dose Route Frequency Provider Last Rate Last Dose   • dextrose (D50W) solution 12.5 g  12.5 g Intravenous Q15 Min PRN Vahid Kent MD       • dextrose 5 % and sodium chloride 0.45 % infusion  150 mL/hr Intravenous  Continuous PRN Vahid Kent MD       • dextrose 5 % and sodium chloride 0.45 % with KCl 20 mEq/L infusion  150 mL/hr Intravenous Continuous PRN Vahid Kent MD       • famotidine (PEPCID) injection 20 mg  20 mg Intravenous Daily Vahid Kent MD   20 mg at 01/08/17 1035   • heparin (porcine) 5000 UNIT/ML injection 5,000 Units  5,000 Units Subcutaneous Q12H Vahid Kent MD   5,000 Units at 01/08/17 1035   • insulin regular (humuLIN R,novoLIN R) 1 Units/mL in sodium chloride 0.9 % 100 mL infusion  0.1 Units/kg/hr Intravenous Titrated Vahid Kent MD 2 mL/hr at 01/08/17 1736 2 Units/hr at 01/08/17 1736   • insulin regular (humuLIN R,novoLIN R) injection 6.8 Units  0.1 Units/kg Intravenous Once PRN Vahid Kent MD       • levothyroxine sodium injection 25 mcg  25 mcg Intravenous Daily Vahid Kent MD   25 mcg at 01/08/17 1040   • magnesium sulfate in D5W 1g/100mL (PREMIX) IVPB 1 g  1 g Intravenous PRN Vahid Kent MD        Or   • magnesium sulfate 8 g in dextrose (D5W) 5 % 250 mL infusion  8 g Intravenous PRN Vahid Kent MD        Or   • magnesium sulfate 6 g in dextrose (D5W) 5 % 250 mL infusion  6 g Intravenous PRN Vahid Kent MD        Or   • magnesium sulfate in D5W 1g/100mL (PREMIX) IVPB 1 g  1 g Intravenous PRN Vahid Kent MD       • meropenem (MERREM) 1 g/100 mL 0.9% NS VTB (mbp)  1 g Intravenous Q12H [facility]       • Pharmacy to dose vancomycin   Does not apply Continuous PRN Vahid Kent MD       • potassium chloride (MICRO-K) CR capsule 10 mEq  10 mEq Oral PRN Vahid Kent MD        Or   • potassium chloride (KLOR-CON) packet 10 mEq  10 mEq Oral PRN Vahid Kent MD        Or   • potassium chloride 10 mEq in 100 mL IVPB  10 mEq Intravenous Q1H PRN Vahid Kent MD       • potassium chloride (MICRO-K) CR capsule 20 mEq  20 mEq Oral PRN Vahid Sands  MD Donte        Or   • potassium chloride (KLOR-CON) packet 20 mEq  20 mEq Oral PRN Vahid Kent MD        Or   • potassium chloride 10 mEq in 100 mL IVPB  10 mEq Intravenous Q1H PRN Vahid Kent MD       • potassium chloride (MICRO-K) CR capsule 40 mEq  40 mEq Oral PRN Vahid Kent MD        Or   • potassium chloride (KLOR-CON) packet 40 mEq  40 mEq Oral PRN Vahid Kent MD        Or   • potassium chloride 10 mEq in 100 mL IVPB  10 mEq Intravenous Q1H PRN Vahid Kent MD       • potassium phosphate 45 mmol in sodium chloride 0.9 % 500 mL infusion  45 mmol Intravenous PRN Vahid Kent MD        Or   • potassium phosphate 30 mmol in sodium chloride 0.9 % 250 mL infusion  30 mmol Intravenous PRN Vahid Kent MD        Or   • potassium phosphate 15 mmol in sodium chloride 0.9 % 100 mL infusion  15 mmol Intravenous PRN Vahid Kent MD        Or   • sodium phosphate 30 mmol in sodium chloride 0.9 % 250 mL IVPB  30 mmol Intravenous PRN Vahid Kent MD        Or   • sodium phosphate 30 mmol in sodium chloride 0.9 % 100 mL IVPB  30 mmol Intravenous PRN Vahid Kent MD        Or   • sodium phosphate 15 mmol in sodium chloride 0.9 % 100 mL IVPB  15 mmol Intravenous PRN Vahid Kent MD       • sodium chloride 0.45 % infusion  250 mL/hr Intravenous Continuous Vahid Kent MD   Stopped at 01/08/17 1635   • sodium chloride 0.45 % with KCl 20 mEq/L infusion  250 mL/hr Intravenous Continuous PRN Vahid Kent  mL/hr at 01/08/17 1635 250 mL/hr at 01/08/17 1635   • sodium chloride 0.9 % flush 10 mL  10 mL Intravenous PRN Galo Brody MD       • vancomycin (dosing per levels)   Does not apply Daily Maria Gu[facility]upe Edgar [facility]           dextrose 5 % and sodium chloride 0.45 % 150 mL/hr    dextrose 5 % and sodium chloride 0.45 % with KCl 20 mEq/L 150 mL/hr    insulin regular infusion 1 unit/mL 0.1 Units/kg/hr  Last Rate: 2 Units/hr (01/08/17 1736)   Pharmacy to dose vancomycin     sodium chloride 250 mL/hr Last Rate: Stopped (01/08/17 1635)   sodium chloride 0.45 % with KCl 20 mEq 250 mL/hr Last Rate: 250 mL/hr (01/08/17 1635)     •  dextrose  •  dextrose 5 % and sodium chloride 0.45 %  •  dextrose 5 % and sodium chloride 0.45 % with KCl 20 mEq/L  •  insulin regular  •  magnesium sulfate in D5W 1g/100mL (PREMIX) **OR** magnesium sulfate bolus in 250 mL **OR** magnesium sulfate bolus in 250 mL **OR** magnesium sulfate in D5W 1g/100mL (PREMIX)  •  Pharmacy to dose vancomycin  •  potassium chloride **OR** potassium chloride **OR** potassium chloride  •  potassium chloride **OR** potassium chloride **OR** potassium chloride  •  potassium chloride **OR** potassium chloride **OR** potassium chloride  •  potassium phosphate infusion greater than 15 mMoles **OR** potassium phosphate infusion greater than 15 mMoles **OR** potassium phosphate **OR** sodium phosphate IVPB **OR** sodium phosphate IVPB **OR** sodium phosphate IVPB  •  sodium chloride 0.45 % with KCl 20 mEq  •  sodium chloride  Allergies   Allergen Reactions   • Penicillins      Family History   Problem Relation Age of Onset   • Alzheimer's disease Mother    • Hypertension Mother    • Heart disease Mother      Ischemic   • Migraines Mother    • Diabetes Mother      type 2   • Alcohol abuse Father    • Hypertension Father    • Hyperlipidemia Sister    • Hypertension Sister    • Osteoporosis Sister      Social History     Social History   • Marital status: Single     Spouse name: N/A   • Number of children: N/A   • Years of education: N/A     Occupational History   • Retired      Social History Main Topics   • Smoking status: Never Smoker   • Smokeless tobacco: Never Used   • Alcohol use No   • Drug use: No   • Sexual activity: No     Other Topics Concern   • Not on file     Social History Narrative    Lives in memory care   Code Status: No discussion  Advance  "Directives:  None on medical record, sister Janelle Lezama  Review of Systems - +/- per HPI and symptom review.      PPS: 10%  Visit Vitals   • /41   • Pulse 105   • Temp 98.2 °F (36.8 °C) (Axillary)   • Resp 18   • Ht 68\" (172.7 cm)   • Wt 150 lb (68 kg)   • SpO2 97%   • BMI 22.81 kg/m2     150 lb (68 kg) Body mass index is 22.81 kg/(m^2).  Intake & Output (last day)       01/07 0701 - 01/08 0700 01/08 0701 - 01/09 0700    I.V. (mL/kg)  1374 (20.2)    IV Piggyback  1100    Total Intake(mL/kg)  2474 (36.4)    Urine (mL/kg/hr)  105 (0.1)    Stool  0 (0)    Total Output   105    Net   +2369          Unmeasured Stool Occurrence  1 x          Physical Exam:    General Appearance:    NAD, lying in bed   Head:    Normocephalic, without obvious abnormality, atraumatic   Eyes:            Conjunctivae and sclerae normal, no icterus,  TROY                   Lungs:     Clear to auscultation,respirations regular, even and                  nonlabored    Heart:    Regular rhythm and normal rate, normal S1        Abdomen:     Normal bowel sounds, no masses, soft, non-distended, no       guarding, non tender       Extremities:   no redness, no cyanosis, no edema, no bilateral feet sores or wounds             Pulses:   Distal pulses palpable and equal bilaterally   Skin:  wm, dry       Neurologic:   Opens eyes slightly to name, no other recognition, no myoclonus,          Results from last 7 days  Lab Units 01/08/17  1142   WBC 10*3/mm3 17.28*   HEMOGLOBIN g/dL 8.6*   HEMATOCRIT % 31.7*   PLATELETS 10*3/mm3 365       Results from last 7 days  Lab Units 01/08/17  1550  01/08/17  1009   SODIUM mmol/L 143  < > 139   POTASSIUM mmol/L 4.2  < > 5.7*   CHLORIDE mmol/L 114*  < > 104   TOTAL CO2 mmol/L <10.0*  < > <10.0*   BUN mg/dL 45*  < > 46*   CREATININE mg/dL 2.20*  < > 2.50*   CALCIUM mg/dL 7.7*  < > 8.6*   BILIRUBIN mg/dL  --   --  0.2*   ALK PHOS U/L  --   --  190*   ALT (SGPT) U/L  --   --  3*   AST (SGOT) U/L  --   --  22 "   GLUCOSE mg/dL 330*  < > 940*   < > = values in this interval not displayed.    Results from last 7 days  Lab Units 01/08/17  1550   SODIUM mmol/L 143   POTASSIUM mmol/L 4.2   CHLORIDE mmol/L 114*   TOTAL CO2 mmol/L <10.0*   BUN mg/dL 45*   CREATININE mg/dL 2.20*   GLUCOSE mg/dL 330*   CALCIUM mg/dL 7.7*     Imaging Results (last 72 hours)     Procedure Component Value Units Date/Time    CT Head Without Contrast [[id number]] Collected:  01/08/17 1058     Updated:  01/08/17 1101    Narrative:       EXAMINATION: CT HEAD WO CONTRAST - 01/08/2017     INDICATION: Altered mental status, dementia.     TECHNIQUE: Multiple axial CT imaging was obtained of the head from skull  base to skull vertex without the administration of intravenous contrast.     The radiation dose reduction device was turned on for each scan per the  ALARA (As Low as Reasonably Achievable) protocol.     COMPARISON: 12/24/2016.     FINDINGS: Chronic small vessel ischemic change is seen throughout the  periventricular and subcortical white matter. No intracranial hemorrhage  or hydrocephalus. No mass, mass effect, or midline shift. No abnormal  extra-axial fluid collections identified. The bony structures reveal no  evidence of osseous abnormality. The visualized paranasal sinuses are  clear. The mastoid air cells are patent.       Impression:       Chronic changes with no acute intracranial abnormality  identified.     DICTATED:     01/08/2017  EDITED:          01/08/2017       XR Chest 1 View [[id number]] Collected:  01/08/17 1112     Updated:  01/08/17 1112    Narrative:          EXAMINATION: XR CHEST, SINGLE VIEW      INDICATION: Aspiration.      COMPARISON: 12/23/2016.     FINDINGS: Portable chest reveals the cardiomediastinal silhouettes to be  within normal limits. The lung fields are clear. No focal parenchymal  opacification is present. No pleural effusion or pneumothorax.  Degenerative change is seen within the spine. Vascular calcifications  are  identified.        Impression:       No acute cardiopulmonary disease.     DICTATED:     01/08/2017  EDITED:          01/08/2017           Impression: 66 y.o. female with advanced dementia, shock with DKA, sepsis  Plan:   AMS, lethargy - continue to support family at bedside with acute change in clinical status.     Goals of care discussion - >30 min at bedside. Sister, Lacy, is ready to transition to more comfort focused plan of care; however she is wanting to support the patient until her sister, Ana, can arrive from [place]. (expected in tomorrow late afternoon).   Discussed current clinical status, continued functional decline with dementia and learning about the patient's life and what would have been her wishes about quality of life concerns and plan of care changes.        Sharon Ling, APRN  [phone]  01/08/17  5:40 PM      Time: >60 minutes spent reviewing medical and medication records, assessing and examining patient, discussing with family and nursing staff, answering questions, formulating a plan and documentation of care.   > 50% time spent face to face

## 2017-01-08 NOTE — CONSULTS
Adult Nutrition  Assessment/PES    Patient Name:  Adrianna Casiano  YOB: 1950  MRN: [id number]  Admit Date:  1/8/2017    Assessment Date:  1/8/2017  Comments:  No nutrition intervention warranted at this time; await [facility].      Reason for Assessment       01/08/17 1330    Reason for Assessment    Reason For Assessment/Visit physician consult    Identified At Risk By Screening Criteria MST SCORE 2+    Time Spent (min) 20    Diagnosis --   per notes and diagnosis of this adm   Patient Active Problem List   Diagnosis   • Abnormal weight loss   • Alzheimer's disease   • Depression   • Hyperlipidemia   • Essential hypertension   • Hypoglycemia   • Hypothyroidism   • Parkinson's disease   • Type 2 diabetes mellitus   • Increased frequency of urination   • UTI (urinary tract infection)   • Leukocytosis   • Lactic acidosis   • Acute metabolic encephalopathy   • Somnolence   • Hyperkalemia   • Hyperglycemia   • Scalp laceration   • Fall   • Anemia   • Sepsis   • Lactic acidosis   • Shock (Hypovolemic, Septic)   • Acute renal failure (R/O due to hypovolemia)   • Metabolic acidosis (DKA and Lactic)   • Diabetic ketoacidosis with coma associated with diabetes mellitus due to underlying condition              Nutrition/Diet History       01/08/17 1330    Nutrition/Diet History    Reported/Observed By Family    Other report no significant wt loss at ns home maybe just a few pounds if any              Labs/Tests/Procedures/Meds       01/08/17 1331    Labs/Tests/Procedures/Meds    Labs/Tests Review Reviewed                Nutrition Prescription Ordered       01/08/17 1331    Nutrition Prescription PO    Current PO Diet NPO                Problem/Interventions:        Problem 1       01/08/17 1331    Nutrition Diagnoses Problem 1    Problem 1 Nutrition Appropriate for Condition at this Time    Etiology (related to) Goals of Care    Signs/Symptoms (evidenced by) NPO                    Intervention Goal       01/08/17  1331    Intervention Goal    General Palliative Care;Hospice Care                Education/Evaluation       01/08/17 1332    Monitor/Evaluation    Monitor Per protocol            Electronically signed by:  Quiana Schuler RD  01/08/17 1:32 PM

## 2017-01-08 NOTE — PLAN OF CARE
Problem: Patient Care Overview (Adult)  Goal: Plan of Care Review  Outcome: Ongoing (interventions implemented as appropriate)    01/08/17 1803   Coping/Psychosocial Response Interventions   Plan Of Care Reviewed With family;durable power of ;sibling   Patient Care Overview   Progress improving   Outcome Evaluation   Outcome Summary/Follow up Plan Pt admitted with SBG of 1052, down to 353 at end of shift. Unresponsive on admission, pontaneously opening eyes and moving extremities periodically toward end of shift as well. Was made Comfort measures after discussion with POA/sister, BP recovered with fluids, UOP beginning to increase as well. Palliative reconsulted, family expressed interest in Hospice. Positive for C. Diff, which was not found on recent previous admissions - unsure if tested for.          Problem: Diabetes, Type 2 (Adult)  Goal: Signs and Symptoms of Listed Potential Problems Will be Absent or Manageable (Diabetes, Type 2)  Outcome: Ongoing (interventions implemented as appropriate)    Problem: Pressure Ulcer Risk (Rigoberto Scale) (Adult,Obstetrics,Pediatric)  Goal: Identify Related Risk Factors and Signs and Symptoms  Outcome: Outcome(s) achieved Date Met:  01/08/17  Goal: Skin Integrity  Outcome: Ongoing (interventions implemented as appropriate)    Problem: Confusion, Chronic (Adult)  Goal: Identify Related Risk Factors and Signs and Symptoms  Outcome: Outcome(s) achieved Date Met:  01/08/17  Goal: Cognitive/Functional Impairments Minimized  Outcome: Ongoing (interventions implemented as appropriate)  Goal: Free from Harm/Injuries  Outcome: Ongoing (interventions implemented as appropriate)    Problem: Fall Risk (Adult)  Goal: Identify Related Risk Factors and Signs and Symptoms  Outcome: Outcome(s) achieved Date Met:  01/08/17  Goal: Absence of Falls  Outcome: Ongoing (interventions implemented as appropriate)

## 2017-01-08 NOTE — ED PROVIDER NOTES
Subjective   HPI Comments: 66-year-old white female presents to the emergency department with mental status changes    Patient has history of recurrent DKA.  She is found poorly responsive today by nursing home staff with an elevated glucose.    EMS arrived with minimal responsiveness, some grunting sounds, tachypnea, and they too high to read fingerstick blood glucose.  She is treated with one aspirin normal saline and transported to the emergency department    Patient is minimally responsive on arrival.  No additional history is obtainable from the patient.    The patient has a DNR notarized copy that arrives with her    Patient is a 66 y.o. female presenting with altered mental status.   History provided by:  EMS personnel  Altered Mental Status   Presenting symptoms: confusion, lethargy, memory loss and partial responsiveness    Severity:  Severe  Most recent episode:  Today  Timing:  Constant  Chronicity:  Recurrent  Context: dementia, nursing home resident and recent illness    Context: not head injury and not homeless        Review of Systems   Unable to perform ROS: Mental status change   Psychiatric/Behavioral: Positive for confusion and memory loss.       Past Medical History   Diagnosis Date   • Acidosis    • Anemia    • Dementia    • Depression    • Diabetes mellitus    • Fall    • HARLAN (generalized anxiety disorder)    • Hyperkalemia    • Hyperlipidemia    • Hypertension    • Hypothyroidism    • Parkinson disease    • Sepsis    • UTI (urinary tract infection)        Allergies   Allergen Reactions   • Penicillins        Past Surgical History   Procedure Laterality Date   • Cataract extraction     • Oophorectomy     • Hysterectomy         Family History   Problem Relation Age of Onset   • Alzheimer's disease Mother    • Hypertension Mother    • Heart disease Mother      Ischemic   • Migraines Mother    • Diabetes Mother      type 2   • Alcohol abuse Father    • Hypertension Father    • Hyperlipidemia  Sister    • Hypertension Sister    • Osteoporosis Sister        Social History     Social History   • Marital status: Single     Spouse name: N/A   • Number of children: N/A   • Years of education: N/A     Occupational History   • Retired      Social History Main Topics   • Smoking status: Never Smoker   • Smokeless tobacco: Never Used   • Alcohol use No   • Drug use: No   • Sexual activity: No     Other Topics Concern   • None     Social History Narrative    Lives in [facility] care           Objective   Physical Exam   Constitutional: She appears well-developed and well-nourished. No distress.   HENT:   Head: Normocephalic and atraumatic.   Nose: Nose normal.   Mouth/Throat: No oropharyngeal exudate.   Dry oral pharynx   Eyes: Conjunctivae are normal. Pupils are equal, round, and reactive to light. Right eye exhibits no discharge. Left eye exhibits no discharge.   Positive doll's eyes, but slowly reactive.  Pupils are reactive and equal but slowly reactive.  EOMs cannot be checked   Neck: Normal range of motion.   Cardiovascular: Regular rhythm, normal heart sounds and intact distal pulses.  Exam reveals no friction rub.    No murmur heard.  Pulmonary/Chest: Breath sounds normal. She has no wheezes. She has no rales. She exhibits no tenderness.   Tachypnea   Abdominal: Soft. Bowel sounds are normal. She exhibits no distension. There is no tenderness. There is no rebound and no guarding.   Musculoskeletal: She exhibits no edema, tenderness or deformity.   Lymphadenopathy:     She has no cervical adenopathy.   Neurological: She has normal reflexes. No cranial nerve deficit. She exhibits abnormal muscle tone.   Patient is flaccid with minimal response to pain.  She has occasional moaning sounds but is otherwise nonverbal.   Skin: Skin is warm and dry.   Nursing note and vitals reviewed.      Critical Care  Performed by: ALLAN MILLAN  Authorized by: ALLAN MILLAN   Total critical care time: 60 minutes  Critical care  time was exclusive of separately billable procedures and treating other patients.  Critical care was necessary to treat or prevent imminent or life-threatening deterioration of the following conditions: CNS failure or compromise and endocrine crisis.  Critical care was time spent personally by me on the following activities: evaluation of patient's response to treatment, ordering and review of laboratory studies, review of old charts, pulse oximetry, development of treatment plan with patient or surrogate, examination of patient, ordering and performing treatments and interventions and re-evaluation of patient's condition.               ED Course  ED Course   Comment By Time   Patient remains obtunded, with improvement of blood pressure with fluid bolus. Galo Brody MD 01/08 0718   Patient improved blood pressure after IV fluid bolus with recurrent hypotension.  2 IVs were run with fluid boluses.  We will fat is started.  White count is elevated.  Patient is significantly acidotic and hypocarbic.  Insulin bolus and drip were started.Antibiotics are initiated.I discussed case with Dr. Elizabeth, who will have Dr. Kent and the patient to the ICU.  I discussed the DNR status of the patient who remains nonverbal and poorly responsive Galo Brody MD 01/08 0808   Levophed is started.  Patient is seen and evaluated by Dr. Kent here in the ED, and we are awaiting an ICU bed Galo Brody MD 01/08 0825                  [facility]    Final diagnoses:   Diabetic ketoacidosis with coma associated with diabetes mellitus due to underlying condition   Somnolence   Hyperosmolar coma   Other specified hypotension   Sepsis due to urinary tract infection   Hyperglycemia   Hyperkalemia            Galo Brody MD  01/08/17 [id number]

## 2017-01-09 PROBLEM — A04.72 CLOSTRIDIUM DIFFICILE ENTEROCOLITIS: Status: ACTIVE | Noted: 2017-01-01

## 2017-01-09 NOTE — PLAN OF CARE
Problem: Diabetes, Type 2 (Adult)  Goal: Signs and Symptoms of Listed Potential Problems Will be Absent or Manageable (Diabetes, Type 2)  Outcome: Ongoing (interventions implemented as appropriate)    01/08/17 1803   Diabetes, Type 2   Problems Assessed (Type 2 Diabetes) all   Problems Present (Type 2 Diabetes) diabetic ketoacidosis (DKA)/hyperosmolar hyperglycemic state (HHS);impaired glycemic control         Problem: Pressure Ulcer Risk (Rigoberto Scale) (Adult,Obstetrics,Pediatric)  Goal: Skin Integrity  Outcome: Ongoing (interventions implemented as appropriate)    01/09/17 0746   Pressure Ulcer Risk (Rigoberto Scale) (Adult,Obstetrics,Pediatric)   Skin Integrity making progress toward outcome         Problem: Confusion, Chronic (Adult)  Goal: Cognitive/Functional Impairments Minimized  Outcome: Ongoing (interventions implemented as appropriate)    01/09/17 0746   Confusion, Chronic (Adult)   Cognitive/Functional Impairments Minimized making progress toward outcome       Goal: Free from Harm/Injuries  Outcome: Ongoing (interventions implemented as appropriate)    01/09/17 0746   Confusion, Chronic (Adult)   Free from Harm/Injuries making progress toward outcome         Problem: Fall Risk (Adult)  Goal: Absence of Falls  Outcome: Ongoing (interventions implemented as appropriate)    01/09/17 0746   Fall Risk (Adult)   Absence of Falls making progress toward outcome

## 2017-01-09 NOTE — PLAN OF CARE
Problem: Patient Care Overview (Adult)  Goal: Plan of Care Review  Outcome: Ongoing (interventions implemented as appropriate)    01/09/17 1430   Coping/Psychosocial Response Interventions   Plan Of Care Reviewed With daughter   Patient Care Overview   Progress declining   Outcome Evaluation   Outcome Summary/Follow up Plan Occasional moaning, slight frown, occasional brief increased work of breathing; MARICRUZ HANSON notified of signs of possible pain. Awaiting sister Ana's arrival to go forward with GOC discussion; no escalation but continue current level of care until then. Ana expected this evening.

## 2017-01-09 NOTE — SIGNIFICANT NOTE
Palliative Team Meeting Attendance  13:00  JOANA Villagran RN, CHDO RICKIE Hart RN, LAVELL Kennedy, [facility], [facility]-   MD MARICRUZ Cooley, APRN

## 2017-01-09 NOTE — TELEPHONE ENCOUNTER
----- Message from Arcelia Garrett sent at 1/9/2017 11:03 AM EST -----  I CALLED THE PATIENT TO CONFIRMED THE APPOINTMENT FOR NEXT Monday AND THE SISTER INFORMED ME THAT VALORIE IS CURRENTLY IN THE ICU.  SHE TOOK A TURN FOR THE WORSE DURING HER HOSPITAL STAY.  ACCORDING TO HER, THEY LOST HER ONCE BUT WAS ABLE TO GET HER BACK.  THEY ARE WAITING ON THEIR OTHER SISTER TO ARRIVE FROM [place].    WE HAVE CANCELED THE APPOINTMENT, BUT I TOLD THE SISTER THAT I WOULD LET DR DRIVER KNOW.

## 2017-01-09 NOTE — PLAN OF CARE
Problem: Patient Care Overview (Adult)  Goal: Plan of Care Review  Outcome: Ongoing (interventions implemented as appropriate)    01/09/17 1845   Patient Care Overview   Progress unable to show any progress toward functional goals   Outcome Evaluation   Outcome Summary/Follow up Plan continuing DKA protocol; sister (Ana) arrived from [place] around 1800; kphos given (45mmol): dka q6 labs reordered; oral vanc initiated (but not given r/t pt npo/aprn does not want ng/keofeed placed)         Problem: Diabetes, Type 2 (Adult)  Goal: Signs and Symptoms of Listed Potential Problems Will be Absent or Manageable (Diabetes, Type 2)  Outcome: Ongoing (interventions implemented as appropriate)    Problem: Pressure Ulcer Risk (Rigoberto Scale) (Adult,Obstetrics,Pediatric)  Goal: Skin Integrity  Outcome: Ongoing (interventions implemented as appropriate)    Problem: Confusion, Chronic (Adult)  Goal: Cognitive/Functional Impairments Minimized  Outcome: Ongoing (interventions implemented as appropriate)  Goal: Free from Harm/Injuries  Outcome: Ongoing (interventions implemented as appropriate)    Problem: Fall Risk (Adult)  Goal: Absence of Falls  Outcome: Ongoing (interventions implemented as appropriate)

## 2017-01-09 NOTE — CONSULTS
Diabetes Education    Patient Name:  Adrianna Casiano  YOB: 1950  MRN: [id number]  Admit Date:  1/8/2017        Consult received for diabetes education. Patient is not appropriate for education. Therefore, education order canceled at this time.       Electronically signed by:  Shaniqua Da Silva RN  01/09/17 8:56 AM

## 2017-01-09 NOTE — PROGRESS NOTES
Pharmacy Consult--Antimicrobial Dosing  Pt is a 67 yo female that pharmacy was consulted to dose vancomycin secondary to sepsis.     Indication: Sepsis  Consulting Provider: Dr. Kent    Current Antimicrobial Therapy  1. Meropenem 1g every 12 hours  2. Vancomycin PTD    Allergies  Penicillins    Labs    Results from last 7 days     Lab Units 01/09/17  0806 01/09/17  0257 01/08/17  1959  01/08/17  1009 01/08/17  0654   SODIUM mmol/L 150* 145 143 < > 139 136   POTASSIUM mmol/L 3.8 4.2 5.2 < > 5.7* 6.6*   CHLORIDE mmol/L 120* 116* 114* < > 104 99   TOTAL CO2 mmol/L 18.0* 18.0* 14.0* < > <10.0* <10.0*   BUN mg/dL 37* 46* 41* < > 46* 50*   CREATININE mg/dL 1.50* 1.80* 2.20* < > 2.50* 2.50*   CALCIUM mg/dL 7.7* 8.0* 7.9* < > 8.6* 8.9   BILIRUBIN mg/dL --  0.2* --  --  0.2* 0.2*   ALK PHOS U/L --  183* --  --  190* 206*   ALT (SGPT) U/L --  10 --  --  3* 3*   AST (SGOT) U/L --  63* --  --  22 12   GLUCOSE mg/dL 172* 203* 245* < > 940* 1052*   < > = values in this interval not displayed.       Results from last 7 days     Lab Units 01/08/17  1142 01/08/17  1009 01/08/17  0654   WBC 10*3/mm3 17.28* 19.67* 17.57*   HEMOGLOBIN g/dL 8.6* 8.8* 10.1*   HEMATOCRIT % 31.7* 30.5* 34.5   PLATELETS 10*3/mm3 365 396 448     Evaluation of Dosing  Weight: 66.7 kg  Last Dose Received in the ED/Outside Facility: 1250 mg at 0744  Goal Trough: 15-20    Estimated Creatinine Clearance: 36.7 mL/min (by C-G formula based on Cr of 1.5).    I/O last 3 completed shifts:  In: 5246.3 [I.V.:4146.3; IV Piggyback:1100]  Out: 935 [Urine:935]  @JOSE ALEJANDROOur Lady of Fatima HospitalFAUZIA@    Microbiology and Radiology  BLOOD CULTURE   Date Value Ref Range Status   01/08/2017 No growth at less than 24 hours  Preliminary     URINE CULTURE   Date Value Ref Range Status   01/08/2017 No growth  Preliminary     Evaluation of Level  Lab Results   Component Value Date    VANCORANDOM 17.50 01/09/2017   Random was appropriately drawn with am labs.     Assessment  Random within goal after one  1250 mg dose. Renal function improving with elevated SCr at 1.5. Documented 1000mg of UOP since the patient admitted. Expect accumulation as the patient reaches steady state concentrations.      Plan:  1. Vancomycin 1000mg (15mg/kg) x 1.  2. Vancomycin Random timed for 1/10 with am labs.  3. Monitor for the s/sx of nephrotoxicity.   4. Pharmacy will continue to monitor and adjust vancomycin dose as necessary based on renal function, cultures, labs, and clinical status.     Vahid Travis, PharmD  1/9/2017  11:05 AM

## 2017-01-09 NOTE — PROGRESS NOTES
Adult Nutrition  Assessment/PES    Patient Name:  Adrianna Casiano  YOB: 1950  MRN: [id number]  Admit Date:  1/8/2017    Assessment Date:  1/9/2017   Comments:  RD follow up. Pt now comfort measures. RD will remain available PRN.        Reason for Assessment       01/09/17 0957    Reason for Assessment    Reason For Assessment/Visit follow up protocol;multidisciplinary rounds    Time Spent (min) 20    Diagnosis Diagnosis   Diagnosis list noted/reviewed   Patient Active Problem List   Diagnosis   • Abnormal weight loss   • Alzheimer's disease   • Depression   • Hyperlipidemia   • Essential hypertension   • Hypoglycemia   • Hypothyroidism   • Parkinson's disease   • Type 2 diabetes mellitus   • Increased frequency of urination   • UTI (urinary tract infection)   • Leukocytosis   • Lactic acidosis   • Acute metabolic encephalopathy   • Somnolence   • Hyperkalemia   • Hyperglycemia   • Scalp laceration   • Fall   • Anemia   • Sepsis   • Lactic acidosis   • Shock (Hypovolemic, Septic)   • Acute renal failure (R/O due to hypovolemia)   • Metabolic acidosis (DKA and Lactic)   • Diabetic ketoacidosis with coma associated with diabetes mellitus due to underlying condition                Nutrition/Diet History       01/09/17 0958    Nutrition/Diet History    Reported/Observed By RN;MD    Other Pt now comfort measures.               Labs/Tests/Procedures/Meds       01/09/17 0958    Labs/Tests/Procedures/Meds    Labs/Tests Review Reviewed;BUN;Creat    Medication Review --   Insulin .5units/hr, ABX, synthroid, heparin                Nutrition Prescription Ordered       01/09/17 0959    Nutrition Prescription PO    Current PO Diet NPO                Problem/Interventions:        Problem 1       01/09/17 1000    Nutrition Diagnoses Problem 1    Problem 1 Nutrition Appropriate for Condition at this Time    Etiology (related to) Goals of Care    Signs/Symptoms (evidenced by) NPO   Pt now comfort measures only                     Intervention Goal       01/09/17 1000    Intervention Goal    General Palliative Care            Nutrition Intervention       01/09/17 1000    Nutrition Intervention    RD/Tech Action Follow Tx progress;Care plan reviewd              Education/Evaluation       01/09/17 1000    Monitor/Evaluation    Monitor Per protocol            Electronically signed by:  Romina Velazquez RDN, JI  01/09/17 10:00 AM

## 2017-01-10 NOTE — PLAN OF CARE
Problem: Patient Care Overview (Adult)  Goal: Plan of Care Review  Outcome: Ongoing (interventions implemented as appropriate)    01/10/17 1200   Coping/Psychosocial Response Interventions   Plan Of Care Reviewed With sibling;family  (sisters Lacy and Ana)   Patient Care Overview   Progress declining   Outcome Evaluation   Outcome Summary/Follow up Plan Palliative APRN and RN conference with pt's sisters at bedside. Both agree best course in accordance with what they feel pt would want is to move toward comfort measures only. Both wish to discuss with their sons before proceeding with comfort measures only, and will let staff know this afternoon.

## 2017-01-10 NOTE — PLAN OF CARE
Problem: Patient Care Overview (Adult)  Goal: Plan of Care Review  Outcome: Ongoing (interventions implemented as appropriate)    Problem: Diabetes, Type 2 (Adult)  Goal: Signs and Symptoms of Listed Potential Problems Will be Absent or Manageable (Diabetes, Type 2)  Outcome: Ongoing (interventions implemented as appropriate)    Problem: Pressure Ulcer Risk (Rigoberto Scale) (Adult,Obstetrics,Pediatric)  Goal: Skin Integrity  Outcome: Ongoing (interventions implemented as appropriate)    Problem: Confusion, Chronic (Adult)  Goal: Cognitive/Functional Impairments Minimized  Outcome: Ongoing (interventions implemented as appropriate)  Goal: Free from Harm/Injuries  Outcome: Ongoing (interventions implemented as appropriate)    Problem: Fall Risk (Adult)  Goal: Absence of Falls  Outcome: Ongoing (interventions implemented as appropriate)

## 2017-01-10 NOTE — PROGRESS NOTES
INTENSIVIST NOTE     1/10/2017  Hospital Day: 2      Ms. Adrianna Casiano, 66 y.o. female is followed for:   Principal Problem:    Sepsis  Active Problems:    Suspected UTI but culture negative so far    Clostridium difficile enterocolitis    Shock (Hypovolemic, Septic)    Metabolic acidosis (DKA and Lactic)    Diabetic ketoacidosis with coma associated with diabetes mellitus due to underlying condition    Acute metabolic encephalopathy    Acute renal failure (R/O due to hypovolemia)-resolving    Alzheimer's disease/nursing home patient    Hypothyroidism    Parkinson's disease         SUBJECTIVE     Subjective    66-year-old debilitated demented nursing home patient just discharged from [facility] from the hospitalist service 1/3/17. She had been brought to the hospital from the nursing home after a fall on 12/23/16 and while being evaluated was found to have a high potassium and a urinary tract infection. She was admitted for urosepsis and a lactic acidosis and DKA but improved with IV antibiotics. Urine culture was negative but glycemic control was very difficult with very labile blood sugars. She was brought back to the ER 1/8/17 with a decreased level of consciousness. She was markedly hyperglycemic with glucose greater than 1000 and pH of 6.8 and obtunded (DKA). In addition the urine again look grossly infected (as it turns out however is culture negative so far).     Patient remains stuporous and poorly with Cheyne-Johnson respirations but grimaces due to painful stimuli. Lays supine without any active movement. Eyes are open and she stares straight ahead. Her urine cultures came back negative but her C. difficile toxin was positive. Initial antimicrobial therapy was vancomycin and Merrem. Oral vancomycin was added to this regimen since her C. difficile toxin came back positive (she however had no NG tube and because of her DO NOT RESUSCITATE status we did not wish to place 1). Because no other cultures have returned  "positive her regimen now includes IV Flagyl, and tigecycline. This is dual therapy for C. difficile but the tigecycline will also cover gram negatives in case cultures missed anything. Merrem has been stopped. Urine cultures resulted greater than 100,000 yeast today from specimen sent 1/8/17.    The patient's relevant past medical, surgical and social history were reviewed and updated in Epic as appropriate.    OBJECTIVE     Visit Vitals   • /56   • Pulse 96   • Temp 99.3 °F (37.4 °C)   • Resp 18   • Ht 68\" (172.7 cm)   • Wt 147 lb (66.7 kg)   • SpO2 99% on room air    • BMI 22.35 kg/m2            Flowsheet Rows         First Filed Value    Admission Height  68\" (172.7 cm) Documented at 01/08/2017 0632    Admission Weight  150 lb (68 kg) Documented at 01/08/2017 0632        Intake & Output (last day)       01/09 0701 - 01/10 0700 01/10 0701 - 01/11 0700    I.V. (mL/kg) 3080.6 (46.2) 2082.3 (31.2)    IV Piggyback 393 400    Total Intake(mL/kg) 3473.6 (52.1) 2482.3 (37.2)    Urine (mL/kg/hr) 1685 (1.1) 1400 (1.8)    Stool      Total Output 1685 1400    Net +1788.6 +1082.3                Objective:  Vital signs: (most recent): Blood pressure 151/65, pulse 87, temperature 99.5 °F (37.5 °C), temperature source Core, resp. rate 22, height 68\" (172.7 cm), weight 147 lb (66.7 kg), SpO2 98 %.              Exam:  General Exam:  Middle-aged debilitated looking white female  HEENT: Pupils equal and reactive. Nose and throat clear.  Neck:                          Supple, no JVD, thyromegaly, or adenopathy  Lungs: Coarse breath sounds with occasional stridor  Cardiovascular: Regular rate and rhythm without murmurs or gallops.  Abdomen: Soft nontender without organomegaly or masses.   and rectal: Deferred.  Extremities: No cyanosis clubbing edema.  Neurologic:                 Demented. Cannot follow commands. Eyes open and staring. Cheyne-Johnson respirations    Chest X-Ray: Last chest x-ray was 1/8/17 and revealed no  " abnormalities except some calcification in the aortic knob.    INFUSIONS    dextrose 5 % and sodium chloride 0.45 % with KCl 20 mEq/L 150 mL/hr Last Rate: 150 mL/hr (01/10/17 1548)   insulin regular infusion 1 unit/mL 0.1 Units/kg/hr Last Rate: 1.5 Units/hr (01/10/17 1800)         Results from last 7 days  Lab Units 01/10/17  0400 01/08/17  1142 01/08/17  1009   WBC 10*3/mm3 10.95* 17.28* 19.67*   HEMOGLOBIN g/dL 10.2* 8.6* 8.8*   HEMATOCRIT % 31.5* 31.7* 30.5*   PLATELETS 10*3/mm3 294 365 396       Results from last 7 days  Lab Units 01/10/17  1223 01/10/17  0400   SODIUM mmol/L 141 147*   POTASSIUM mmol/L 3.9 3.8   CHLORIDE mmol/L 113* 118*   TOTAL CO2 mmol/L 19.0* 19.0*   BUN mg/dL 13 21   CREATININE mg/dL 0.70 0.90   GLUCOSE mg/dL 176* 112*   CALCIUM mg/dL 7.8* 8.1*       Results from last 7 days  Lab Units 01/10/17  1223 01/10/17  0400 01/10/17  0046   MAGNESIUM mg/dL 2.9* 2.0 1.9   PHOSPHORUS mg/dL 1.7* 2.8 3.4       No results found for: SEDRATE  No results found for: BNP  Lab Results   Component Value Date    TROPONINI <0.006 12/23/2016     Lab Results   Component Value Date    TSH 1.214 01/09/2017     Lab Results   Component Value Date    LACTATE 1.7 01/09/2017     No results found for: CORTISOL      Results from last 7 days  Lab Units 01/08/17  0742   PH, ARTERIAL pH units 6.838*   PCO2, ARTERIAL mm Hg 15.3*   PO2 ART mm Hg 134.0*   HCO3 ART mmol/L 2.6*   FIO2 % 80       I reviewed the patient's results, images and medication.    Assessment/Plan   ASSESSMENT      Principal Problem:    Sepsis  Active Problems:    Suspected UTI but culture negative so far    Clostridium difficile enterocolitis    Shock (Hypovolemic, Septic)    Metabolic acidosis (DKA and Lactic)    Diabetic ketoacidosis with coma associated with diabetes mellitus due to underlying condition    Acute metabolic encephalopathy    Acute renal failure (R/O due to hypovolemia)-resolving    Alzheimer's disease/nursing home patient     Hypothyroidism    Parkinson's disease      DISCUSSION: Blood glucose controlled but she remains acidotic (serum bicarbonate 19). I have not treated her more aggressively with close follow-up ABGs etc. to evaluate her pH because of her markedly debilitated state, DO NOT RESUSCITATE status, and the fact that we are waiting on family to make a decision regarding comfort measures only and transfer to palliative care. Palliative care has seen the patient and all agreed that comfort focused plan is what we should be doing rather than repetitive labs, antimicrobial therapy, fluids etc. unfortunately the sister that we were waiting on is still not ready to make that decision. She wishes to talk to additional grandchildren so we will continue support as is    PLAN     1. Continue same level of support  2. Agree with DO NOT RESUSCITATE status  3. We have added enteral vancomycin to cover Clostridium difficile, and tigecycline for dual therapy. We did not continue Merrem so we are lacking gram-positive coverage.  4. If no decision can be made by tomorrow we will place an NG tube, start enteral feeds, and resume NG vancomycin  6. Start NG Diflucan    Plan of care and goals reviewed with mulitdisciplinary team at daily rounds.    I discussed the patient's findings and my recommendations with family and nursing staff    Time spent Critical care 35 min (It does not include procedure time).    Vahid Mas MD  Intensive Care Medicine  01/10/17 6:36 PM

## 2017-01-10 NOTE — PROGRESS NOTES
INTENSIVIST NOTE     1/9/2017  Hospital Day: 1      Ms. Adrianna Casiano, 66 y.o. female is followed for:   Principal Problem:    Sepsis  Active Problems:    Suspected UTI but culture negative so far    Clostridium difficile enterocolitis    Shock (Hypovolemic, Septic)    Metabolic acidosis (DKA and Lactic)    Diabetic ketoacidosis with coma associated with diabetes mellitus due to underlying condition    Acute metabolic encephalopathy    Acute renal failure (R/O due to hypovolemia)-resolving    Alzheimer's disease/nursing home patient    Hypothyroidism    Parkinson's disease         SUBJECTIVE     Subjective    66-year-old debilitated demented nursing home patient just discharged from [facility] from the hospitalist service 1/3/17. She had been brought to the hospital from the nursing home after a fall on 12/23 seconds 16 and while being evaluated was found to have a high potassium and a urinary tract infection. She was admitted for urosepsis and a lactic acidosis and DKA but improved with IV antibiotics. Urine culture was negative but I see me control was very difficult due to the labile nature of her sugars. She was brought back to the ER 1/8/17 with a decreased level of consciousness. She was markedly hyperglycemic with glucose greater than 1000 and pH of 6.8 and obtunded (DKA). In addition the urine again look grossly infected (as it turns out however is culture negative so far).     Today the patient is somewhat stuporous with Cheyne-Johnson respirations but in no distress. Her urine cultures are coming back negative but her C. difficile toxin is positive. Present antimicrobial therapy is vancomycin and Merrem but oral vancomycin has been added to this regimen since her C. difficile toxin came back positive.    The patient's relevant past medical, surgical and social history were reviewed and updated in Epic as appropriate.    OBJECTIVE     Visit Vitals   • /56   • Pulse 96   • Temp 99.3 °F (37.4 °C)   • Resp 18  "  • Ht 68\" (172.7 cm)   • Wt 147 lb (66.7 kg)   • SpO2 99% on room air    • BMI 22.35 kg/m2            Flowsheet Rows         First Filed Value    Admission Height  68\" (172.7 cm) Documented at 01/08/2017 0632    Admission Weight  150 lb (68 kg) Documented at 01/08/2017 0632        Intake & Output (last day)       01/09 0701 - 01/10 0700    I.V. (mL/kg) 1520 (22.8)    IV Piggyback 393    Total Intake(mL/kg) 1913 (28.7)    Urine (mL/kg/hr) 725 (0.8)    Stool     Total Output 725    Net +1188               Objective    Exam:  General Exam:  Middle-aged debilitated looking white female  HEENT: Pupils equal and reactive. Nose and throat clear.  Neck:                          Supple, no JVD, thyromegaly, or adenopathy  Lungs: Coarse breath sounds with occasional stridor  Cardiovascular: Regular rate and rhythm without murmurs or gallops.  Abdomen: Soft nontender without organomegaly or masses.   and rectal: Deferred.  Extremities: No cyanosis clubbing edema.  Neurologic:                 Demented. Cannot follow commands. Cheyne-Johnson respirations    Chest X-Ray: Yesterday's chest x-ray without abnormalities except some calcification in the aortic knob.    INFUSIONS    dextrose 5 % and sodium chloride 0.45 % with KCl 20 mEq/L 150 mL/hr Last Rate: 150 mL/hr (01/09/17 1543)   insulin regular infusion 1 unit/mL 0.1 Units/kg/hr Last Rate: 1.5 Units/hr (01/09/17 1700)   Pharmacy to dose vancomycin           Results from last 7 days  Lab Units 01/08/17  1142 01/08/17  1009 01/08/17  0654   WBC 10*3/mm3 17.28* 19.67* 17.57*   HEMOGLOBIN g/dL 8.6* 8.8* 10.1*   HEMATOCRIT % 31.7* 30.5* 34.5   PLATELETS 10*3/mm3 365 396 448       Results from last 7 days  Lab Units 01/09/17  1733 01/09/17  0806   SODIUM mmol/L 150* 150*   POTASSIUM mmol/L 4.2 3.8   CHLORIDE mmol/L 123* 120*   TOTAL CO2 mmol/L 13.0* 18.0*   BUN mg/dL 24* 37*   CREATININE mg/dL 1.10 1.50*   GLUCOSE mg/dL 196* 172*   CALCIUM mg/dL 8.0* 7.7*       Results from last 7 " days  Lab Units 01/09/17  1733 01/09/17  0806 01/09/17  0014 01/08/17  1959   MAGNESIUM mg/dL 2.1 2.1 2.2 2.2   PHOSPHORUS mg/dL 2.8 1.2*  --  3.3       No results found for: SEDRATE  No results found for: BNP  Lab Results   Component Value Date    TROPONINI <0.006 12/23/2016     Lab Results   Component Value Date    TSH 1.214 01/09/2017     Lab Results   Component Value Date    LACTATE 1.7 01/09/2017     No results found for: CORTISOL      Results from last 7 days  Lab Units 01/08/17  0742   PH, ARTERIAL pH units 6.838*   PCO2, ARTERIAL mm Hg 15.3*   PO2 ART mm Hg 134.0*   HCO3 ART mmol/L 2.6*   FIO2 % 80       I reviewed the patient's results, images and medication.    Assessment/Plan   ASSESSMENT      Principal Problem:    Sepsis  Active Problems:    Suspected UTI but culture negative so far    Clostridium difficile enterocolitis    Shock (Hypovolemic, Septic)    Metabolic acidosis (DKA and Lactic)    Diabetic ketoacidosis with coma associated with diabetes mellitus due to underlying condition    Acute metabolic encephalopathy    Acute renal failure (R/O due to hypovolemia)-resolving    Alzheimer's disease/nursing home patient    Hypothyroidism    Parkinson's disease      DISCUSSION: Has improved clinically with improvement in her blood pressure, and glucose but she remains acidotic (serum bicarbonate 13). Palliative care has seen the patient and all agreed that comfort focused plan is what we should be doing rather than repetitive labs, antimicrobial therapy, fluids etc. We are apparently however waiting for a sister to arrive from [place] and she will not be here until tomorrow    PLAN     1. Continue same level of support  2. Agree with DO NOT RESUSCITATE status  3. We have added enteral vancomycin to cover Clostridium difficile  4. Since we do not have any staph growing, I prefer to stop vancomycin and just leave the patient on Merrem with the addition of IV Flagyl for double coverage for C. difficile    Plan  of care and goals reviewed with mulitdisciplinary team at daily rounds.    I discussed the patient's findings and my recommendations with family and nursing staff    Time spent Critical care 35 min (It does not include procedure time).    Vahid Mas MD  Intensive Care Medicine  01/09/17 7:49 PM

## 2017-01-10 NOTE — PROGRESS NOTES
"Palliative Care Progress Note    Date of Admission: 1/8/2017    Subjective: patient's eyes are open. Not able to answer yes, no questions.   Prn morphine x2 given yesterday, with reports of moaning and appeared uncomfortable.   Patient seems more awake today.     Reviewed scheduled and prn medications.    dextrose 5 % and sodium chloride 0.45 % with KCl 20 mEq/L 150 mL/hr Last Rate: 150 mL/hr (01/10/17 0842)   insulin regular infusion 1 unit/mL 0.1 Units/kg/hr Last Rate: 0.75 Units/hr (01/10/17 0841)     •  dextrose  •  dextrose 5 % and sodium chloride 0.45 % with KCl 20 mEq/L  •  insulin regular  •  magnesium sulfate in D5W 1g/100mL (PREMIX) **OR** magnesium sulfate bolus in 250 mL **OR** magnesium sulfate bolus in 250 mL **OR** magnesium sulfate in D5W 1g/100mL (PREMIX)  •  Morphine  •  Morphine  •  potassium chloride **OR** potassium chloride **OR** potassium chloride  •  potassium chloride **OR** potassium chloride **OR** potassium chloride  •  potassium chloride **OR** potassium chloride **OR** potassium chloride  •  potassium phosphate infusion greater than 15 mMoles **OR** potassium phosphate infusion greater than 15 mMoles **OR** potassium phosphate **OR** sodium phosphate IVPB **OR** sodium phosphate IVPB **OR** sodium phosphate IVPB  •  sodium chloride    Objective:  PPS 10%  Visit Vitals   • /75   • Pulse 80   • Temp 99.3 °F (37.4 °C) (Core)   • Resp 20   • Ht 68\" (172.7 cm)   • Wt 147 lb (66.7 kg)   • SpO2 99%   • BMI 22.35 kg/m2      Intake & Output (last day)       01/09 0701 - 01/10 0700 01/10 0701 - 01/11 0700    I.V. (mL/kg) 3080.6 (46.2) 1178.8 (17.7)    IV Piggyback 393 200    Total Intake(mL/kg) 3473.6 (52.1) 1378.8 (20.7)    Urine (mL/kg/hr) 1685 (1.1) 850 (1.6)    Stool      Total Output 1685 850    Net +1788.6 +528.8              Lab Results (last 24 hours)     Procedure Component Value Units Date/Time    POC Glucose Fingerstick [[id number]]  (Normal) Collected:  01/09/17 7009    Specimen: "  Blood Updated:  01/09/17 1610     Glucose 98 mg/dL     Narrative:       Meter: [id number] : [id number] Kendall Sakina    POC Glucose Fingerstick [[id number]]  (Normal) Collected:  01/09/17 1458    Specimen:  Blood Updated:  01/09/17 1611     Glucose 113 mg/dL     Narrative:       Meter: [id number] : [id number] Stuart Jasmina    POC Glucose Fingerstick [[id number]]  (Normal) Collected:  01/09/17 1622    Specimen:  Blood Updated:  01/09/17 1644     Glucose 92 mg/dL     Narrative:       Meter: [id number] : [id number] Kendall Sakina    POC Glucose Fingerstick [[id number]]  (Abnormal) Collected:  01/09/17 1700    Specimen:  Blood Updated:  01/09/17 1720     Glucose 157 (H) mg/dL     Narrative:       Meter: [id number] : [id number] Kendall Sakina    POC Glucose Fingerstick [[id number]]  (Abnormal) Collected:  01/09/17 1739    Specimen:  Blood Updated:  01/09/17 1740     Glucose 197 (H) mg/dL     Narrative:       Meter: [id number] : [id number] Burgess Freedman    Calcium, Ionized [[id number]]  (Abnormal) Collected:  01/09/17 1733    Specimen:  Blood Updated:  01/09/17 1806     Ionized Calcium 0.96 (L) mmol/L     Phosphorus [[id number]]  (Normal) Collected:  01/09/17 1733    Specimen:  Blood Updated:  01/09/17 1821     Phosphorus 2.8 mg/dL     Magnesium [[id number]]  (Normal) Collected:  01/09/17 1733    Specimen:  Blood Updated:  01/09/17 1821     Magnesium 2.1 mg/dL     Basic Metabolic Panel [[id number]]  (Abnormal) Collected:  01/09/17 1733    Specimen:  Blood Updated:  01/09/17 1822     Glucose 196 (H) mg/dL      BUN 24 (H) mg/dL      Creatinine 1.10 mg/dL      Sodium 150 (H) mmol/L      Potassium 4.2 mmol/L      Chloride 123 (H) mmol/L      CO2 13.0 (L) mmol/L      Calcium 8.0 (L) mg/dL      eGFR Non African Amer 50 (L) mL/min/1.73      BUN/Creatinine Ratio 21.8      Anion Gap 14.0 (H) mmol/L     Narrative:       National Kidney [employer] Guidelines    Stage                           Description                              GFR                      1                               Normal or High                          90+  2                               Mild decrease                            60-89  3                               Moderate decrease                   30-59  4                               Severe decrease                       15-29  5                               Kidney failure                             <15    POC Glucose Fingerstick [[id number]]  (Abnormal) Collected:  01/09/17 1907    Specimen:  Blood Updated:  01/09/17 1919     Glucose 147 (H) mg/dL     Narrative:       Meter: [id number] : [id number] Flinchum Nina    pH, Venous [[id number]] Collected:  01/09/17 1901    Specimen:  Venous Blood Updated:  01/09/17 1957     pH, Venous 7.372     POC Glucose Fingerstick [[id number]]  (Abnormal) Collected:  01/09/17 2002    Specimen:  Blood Updated:  01/09/17 2004     Glucose 144 (H) mg/dL     Narrative:       Meter: [id number] : [id number] Bryon Tanja    POC Glucose Fingerstick [[id number]]  (Abnormal) Collected:  01/09/17 2104    Specimen:  Blood Updated:  01/09/17 2116     Glucose 135 (H) mg/dL     Narrative:       Meter: [id number] : [id number] Fllashellm Nina    POC Glucose Fingerstick [[id number]]  (Abnormal) Collected:  01/09/17 2154    Specimen:  Blood Updated:  01/09/17 2155     Glucose 155 (H) mg/dL     Narrative:       Meter: [id number] : [id number] Bryon Tanja    POC Glucose Fingerstick [[id number]]  (Abnormal) Collected:  01/09/17 2254    Specimen:  Blood Updated:  01/09/17 2255     Glucose 252 (H) mg/dL     Narrative:       Meter: [id number] : [id number] Bryon Tanja    POC Glucose Fingerstick [[id number]]  (Abnormal) Collected:  01/08/17 1410    Specimen:  Blood Updated:  01/09/17 2347     Glucose 525 (C) mg/dL     Narrative:       Meter: [id number] : [id number] Lacie Mayy    POC Glucose Fingerstick [[id number]]  (Abnormal) Collected:  01/08/17 1503    Specimen:  Blood  Updated:  01/09/17 2347     Glucose 421 (H) mg/dL     Narrative:       Meter: [id number] : [id number] Kendal REBOLLAR    POC Glucose Fingerstick [[id number]]  (Abnormal) Collected:  01/08/17 1557    Specimen:  Blood Updated:  01/09/17 2347     Glucose 290 (H) mg/dL     Narrative:       Meter: [id number] : [id number] Hammac Solomon    POC Glucose Fingerstick [[id number]]  (Abnormal) Collected:  01/09/17 2358    Specimen:  Blood Updated:  01/10/17 0010     Glucose 245 (H) mg/dL     Narrative:       Meter: [id number] : [id number] Fllashellm Nina    POC Glucose Fingerstick [[id number]]  (Abnormal) Collected:  01/10/17 0102    Specimen:  Blood Updated:  01/10/17 0103     Glucose 206 (H) mg/dL     Narrative:       Meter: [id number] : [id number] Bryon Tanja    Phosphorus [[id number]]  (Normal) Collected:  01/10/17 0046    Specimen:  Blood Updated:  01/10/17 0140     Phosphorus 3.4 mg/dL     Magnesium [[id number]]  (Normal) Collected:  01/10/17 0046    Specimen:  Blood Updated:  01/10/17 0140     Magnesium 1.9 mg/dL     Basic Metabolic Panel [[id number]]  (Abnormal) Collected:  01/10/17 0046    Specimen:  Blood Updated:  01/10/17 0141     Glucose 247 (H) mg/dL      BUN 23 mg/dL      Creatinine 1.00 mg/dL      Sodium 146 mmol/L      Potassium 3.9 mmol/L      Chloride 119 (H) mmol/L      CO2 18.0 (L) mmol/L      Calcium 7.5 (L) mg/dL      eGFR Non African Amer 55 (L) mL/min/1.73      BUN/Creatinine Ratio 23.0      Anion Gap 9.0 mmol/L     Narrative:       National Kidney Foundation Guidelines    Stage                           Description                             GFR                      1                               Normal or High                          90+  2                               Mild decrease                            60-89  3                               Moderate decrease                   30-59  4                               Severe decrease                       15-29  5                                Kidney failure                             <15    Calcium, Ionized [[id number]]  (Abnormal) Collected:  01/10/17 0046    Specimen:  Blood Updated:  01/10/17 0143     Ionized Calcium 1.08 (L) mmol/L     POC Glucose Fingerstick [[id number]]  (Abnormal) Collected:  01/10/17 0158    Specimen:  Blood Updated:  01/10/17 0209     Glucose 226 (H) mg/dL     Narrative:       Meter: [id number] : [id number] Flinchum Nina    POC Glucose Fingerstick [[id number]]  (Abnormal) Collected:  01/10/17 0301    Specimen:  Blood Updated:  01/10/17 0313     Glucose 139 (H) mg/dL     Narrative:       Meter: [id number] : [id number] Flinchum Nina    POC Glucose Fingerstick [[id number]]  (Abnormal) Collected:  01/10/17 0411    Specimen:  Blood Updated:  01/10/17 0423     Glucose 140 (H) mg/dL     Narrative:       Meter: [id number] : [id number] Flinchum Nina    POC Glucose Fingerstick [[id number]]  (Normal) Collected:  01/10/17 0505    Specimen:  Blood Updated:  01/10/17 0506     Glucose 118 mg/dL     Narrative:       Meter: [id number] : [id number] Bryon Agrawal    CBC & Differential [[id number]] Collected:  01/10/17 0400    Specimen:  Blood Updated:  01/10/17 0520    Narrative:       The following orders were created for panel order CBC & Differential.  Procedure                               Abnormality         Status                     ---------                               -----------         ------                     CBC Auto Differential[[id number]]         Abnormal            Final result                 Please view results for these tests on the individual orders.    CBC Auto Differential [[id number]]  (Abnormal) Collected:  01/10/17 0400    Specimen:  Blood Updated:  01/10/17 0520     WBC 10.95 (H) 10*3/mm3      RBC 3.31 (L) 10*6/mm3      Hemoglobin 10.2 (L) g/dL      Hematocrit 31.5 (L) %      MCV 95.2 fL      MCH 30.8 pg      MCHC 32.4 g/dL      RDW 14.5 %      RDW-SD 50.7 fl      MPV 10.0 fL      Platelets 294  10*3/mm3      Neutrophil % 70.0 %      Lymphocyte % 20.3 (L) %      Monocyte % 8.9 %      Eosinophil % 0.3 %      Basophil % 0.1 %      Immature Grans % 0.4 %      Neutrophils, Absolute 7.68 10*3/mm3      Lymphocytes, Absolute 2.22 10*3/mm3      Monocytes, Absolute 0.97 10*3/mm3      Eosinophils, Absolute 0.03 (L) 10*3/mm3      Basophils, Absolute 0.01 10*3/mm3      Immature Grans, Absolute 0.04 (H) 10*3/mm3     POC Glucose Fingerstick [[id number]]  (Normal) Collected:  01/10/17 0615    Specimen:  Blood Updated:  01/10/17 0626     Glucose 119 mg/dL     Narrative:       Meter: [id number] : [id number] Ángel Wood    Calcium, Ionized [[id number]]  (Normal) Collected:  01/10/17 0400    Specimen:  Blood Updated:  01/10/17 0652     Ionized Calcium 1.13 mmol/L     POC Glucose Fingerstick [[id number]]  (Abnormal) Collected:  01/10/17 0700    Specimen:  Blood Updated:  01/10/17 0720     Glucose 136 (H) mg/dL     Narrative:       Meter: [id number] : [id number] Day Scott    Vancomycin, Random [[id number]]  (Normal) Collected:  01/10/17 0400    Specimen:  Blood Updated:  01/10/17 0744     Vancomycin Random 16.00 mcg/mL     Phosphorus [[id number]]  (Normal) Collected:  01/10/17 0400    Specimen:  Blood Updated:  01/10/17 0744     Phosphorus 2.8 mg/dL     Magnesium [[id number]]  (Normal) Collected:  01/10/17 0400    Specimen:  Blood Updated:  01/10/17 0744     Magnesium 2.0 mg/dL     Basic Metabolic Panel [[id number]]  (Abnormal) Collected:  01/10/17 0400    Specimen:  Blood Updated:  01/10/17 0750     Glucose 112 (H) mg/dL      BUN 21 mg/dL      Creatinine 0.90 mg/dL      Sodium 147 (H) mmol/L      Potassium 3.8 mmol/L      Chloride 118 (H) mmol/L      CO2 19.0 (L) mmol/L      Calcium 8.1 (L) mg/dL      eGFR Non African Amer 62 mL/min/1.73      BUN/Creatinine Ratio 23.3      Anion Gap 10.0 mmol/L     Narrative:       National Kidney Foundation Guidelines    Stage                           Description                              GFR                      1                               Normal or High                          90+  2                               Mild decrease                            60-89  3                               Moderate decrease                   30-59  4                               Severe decrease                       15-29  5                               Kidney failure                             <15    POC Glucose Fingerstick [[id number]]  (Abnormal) Collected:  01/10/17 0755    Specimen:  Blood Updated:  01/10/17 0815     Glucose 168 (H) mg/dL     Narrative:       Meter: [id number] : [id number] Day Scott    POC Glucose Fingerstick [[id number]]  (Abnormal) Collected:  01/10/17 0859    Specimen:  Blood Updated:  01/10/17 0910     Glucose 224 (H) mg/dL     Narrative:       Meter: [id number] : [id number] Enoch Presley    POC Glucose Fingerstick [[id number]]  (Abnormal) Collected:  01/10/17 1021    Specimen:  Blood Updated:  01/10/17 1041     Glucose 171 (H) mg/dL     Narrative:       Meter: [id number] : [id number] Day Scott    Blood Culture [[id number]]  (Normal) Collected:  01/08/17 0853    Specimen:  Blood from Hand, Left Updated:  01/10/17 1101     Blood Culture No growth at 2 days     Blood Culture [[id number]]  (Normal) Collected:  01/08/17 0959    Specimen:  Blood from Hand, Left Updated:  01/10/17 1101     Blood Culture No growth at 2 days     POC Glucose Fingerstick [[id number]]  (Abnormal) Collected:  01/10/17 1102    Specimen:  Blood Updated:  01/10/17 1114     Glucose 150 (H) mg/dL     Narrative:       Meter: [id number] : [id number] Enoch Presley    Urine Culture [[id number]]  (Abnormal) Collected:  01/08/17 0653    Specimen:  Urine from Urine, Catheter Updated:  01/10/17 1212     Urine Culture >100,000 CFU/mL Yeast isolated (A)     Urine Culture [[id number]]  (Abnormal) Collected:  01/08/17 1000    Specimen:  Urine from Urine, Catheter Updated:  01/10/17 1212     Urine Culture >100,000 CFU/mL  Yeast isolated (A)     POC Glucose Fingerstick [[id number]]  (Abnormal) Collected:  01/10/17 1159    Specimen:  Blood Updated:  01/10/17 1225     Glucose 172 (H) mg/dL     Narrative:       Meter: [id number] : [id number] Letha Chavez    POC Glucose Fingerstick [[id number]]  (Abnormal) Collected:  01/10/17 1302    Specimen:  Blood Updated:  01/10/17 1306     Glucose 150 (H) mg/dL     Narrative:       Meter: [id number] : [id number] Amy Zimmer    Phosphorus [[id number]]  (Abnormal) Collected:  01/10/17 1223    Specimen:  Blood Updated:  01/10/17 1313     Phosphorus 1.7 (L) mg/dL     Magnesium [[id number]]  (Abnormal) Collected:  01/10/17 1223    Specimen:  Blood Updated:  01/10/17 1313     Magnesium 2.9 (H) mg/dL     Basic Metabolic Panel [[id number]]  (Abnormal) Collected:  01/10/17 1223    Specimen:  Blood Updated:  01/10/17 1315     Glucose 176 (H) mg/dL      BUN 13 mg/dL      Creatinine 0.70 mg/dL      Sodium 141 mmol/L      Potassium 3.9 mmol/L      Chloride 113 (H) mmol/L      CO2 19.0 (L) mmol/L      Calcium 7.8 (L) mg/dL      eGFR Non African Amer 83 mL/min/1.73      BUN/Creatinine Ratio 18.6      Anion Gap 9.0 mmol/L     Narrative:       National Kidney Foundation Guidelines    Stage                           Description                             GFR                      1                               Normal or High                          90+  2                               Mild decrease                            60-89  3                               Moderate decrease                   30-59  4                               Severe decrease                       15-29  5                               Kidney failure                             <15    Calcium, Ionized [[id number]]  (Abnormal) Collected:  01/10/17 1223    Specimen:  Blood Updated:  01/10/17 1320     Ionized Calcium 1.05 (L) mmol/L     POC Glucose Fingerstick [[id number]]  (Abnormal) Collected:  01/10/17 1403    Specimen:  Blood Updated:  01/10/17  1413     Glucose 133 (H) mg/dL     Narrative:       Meter: [id number] : [id number] Enoch Presley        Imaging Results (last 24 hours)     ** No results found for the last 24 hours. **          Physical Exam:  Gen: NAD, resting in bed with head elevated  Skin: warm, dry   Eyes: TROY, conjunctiva clear and moist   HEENT: oropharynx dry, with clear secretions/drool  Resp/thorax: even effort, non labored, CTA   CV: RRR   ABD: soft, bowel sounds +, nontender  : bernardo to bedside drainage   Ext: no edema, no redness, lower ext mottled  Neuro: awake, unable to follow commands, no myoclonus      Results from last 7 days  Lab Units 01/10/17  0400   WBC 10*3/mm3 10.95*   HEMOGLOBIN g/dL 10.2*   HEMATOCRIT % 31.5*   PLATELETS 10*3/mm3 294       Results from last 7 days  Lab Units 01/10/17  1223  01/09/17  0257   SODIUM mmol/L 141  < > 145   POTASSIUM mmol/L 3.9  < > 4.2   CHLORIDE mmol/L 113*  < > 116*   TOTAL CO2 mmol/L 19.0*  < > 18.0*   BUN mg/dL 13  < > 46*   CREATININE mg/dL 0.70  < > 1.80*   CALCIUM mg/dL 7.8*  < > 8.0*   BILIRUBIN mg/dL  --   --  0.2*   ALK PHOS U/L  --   --  183*   ALT (SGPT) U/L  --   --  10   AST (SGOT) U/L  --   --  63*   GLUCOSE mg/dL 176*  < > 203*   < > = values in this interval not displayed.    Impression: 66 y.o. female with shock (sepsis, DKA, hypovolemic), DM    Plan:   Dysphagia - sister knows that patient would not want to be supported with artificial nutrition.     Goals of care discussion - >30 min discussed with patient's sisters, Lacy and Ana.   Ana lives in [place] and just arrived Monday pm. She has recently had to remove her  from artificial life support and his death.   Lacy had made a decision to support the patient, so Ana could make it here to visit with the patient.   Discussed baseline functional status with progressive dementia (alzheimer's and Parkinson's), recognition that the DKA shock event would have been a fatal event, discussed different plans  of care with current plan and complete comfort plan of care and reminding the sisters that their decision would be based on what would be there sister's wishes if she could understand her overall clinical status. Also, both sisters are aware that dementia is a terminal illness.   They do know that their sister would not want any long term artificial nutrition.   Ana may require some time to be at the same place as her sister, Lacy.  The two sisters requested time to speak with their children and directed them to let the nurse know if they had made any decision to transition to comfort focused plan of care with no further antibiotics and specific about fingersticks and insulin management.     Sharon Ling, MARGIE  [phone]  01/10/17  2:56 PM

## 2017-01-10 NOTE — PLAN OF CARE
Problem: Patient Care Overview (Adult)  Goal: Plan of Care Review  Outcome: Ongoing (interventions implemented as appropriate)    01/10/17 0055   Coping/Psychosocial Response Interventions   Plan Of Care Reviewed With patient   Patient Care Overview   Progress no change   Outcome Evaluation   Outcome Summary/Follow up Plan Patient not able to respond to anything but pain. DKA protocol is still active. Flagyl added to antibiotic schedule. Will continue to monitor.         Problem: Diabetes, Type 2 (Adult)  Goal: Signs and Symptoms of Listed Potential Problems Will be Absent or Manageable (Diabetes, Type 2)  Outcome: Ongoing (interventions implemented as appropriate)    Problem: Pressure Ulcer Risk (Rigoberto Scale) (Adult,Obstetrics,Pediatric)  Goal: Skin Integrity  Outcome: Ongoing (interventions implemented as appropriate)    Problem: Fall Risk (Adult)  Goal: Absence of Falls  Outcome: Ongoing (interventions implemented as appropriate)

## 2017-01-10 NOTE — PLAN OF CARE
"Problem: Patient Care Overview (Adult)  Goal: Plan of Care Review    01/10/17 1826   Outcome Evaluation   Outcome Summary/Follow up Plan palliative care team spoke with sisters (still deciding, \"leaning toward stopping things\"); tigecycline started; dka protocol continue; mag replaced; slightly more responsive;           "

## 2017-01-10 NOTE — SIGNIFICANT NOTE
Palliative Team Meeting Attendance  13:00  JOANA Villagran RN, PN              DO RICKIE Ga RN, PN   LAVELL Laurent, [facility], [facility]   MD MARICRUZ Cooley, MARGIE Howard, MSW   BEVERLY Sanderson RN, [facility]

## 2017-01-11 PROBLEM — J18.9 PNEUMONIA: Status: ACTIVE | Noted: 2017-01-01

## 2017-01-11 NOTE — PROGRESS NOTES
"Palliative Care Progress Note    Date of Admission: 1/8/2017    Subjective:  Awake, appears restful  Updated and reviewed with nurse.   No events overnight    Reviewed scheduled and prn medications.     Morphine  •  Morphine  •  sodium chloride    Objective:  PPS 10%  Visit Vitals   • /60   • Pulse 100   • Temp 99.3 °F (37.4 °C)   • Resp 24   • Ht 68\" (172.7 cm)   • Wt 147 lb (66.7 kg)   • SpO2 98%   • BMI 22.35 kg/m2      Intake & Output (last day)       01/10 0701 - 01/11 0700 01/11 0701 - 01/12 0700    I.V. (mL/kg) 2790.3 (41.8) 1049.8 (15.7)    IV Piggyback 1300 100    Total Intake(mL/kg) 4090.3 (61.3) 1149.8 (17.2)    Urine (mL/kg/hr) 3260 (2) 1400 (1.8)    Total Output 3260 1400    Net +830.3 -250.2              Lab Results (last 24 hours)     Procedure Component Value Units Date/Time    Phosphorus [[id number]]  (Abnormal) Collected:  01/10/17 1746    Specimen:  Blood Updated:  01/10/17 1909     Phosphorus 1.4 (L) mg/dL     Magnesium [[id number]]  (Normal) Collected:  01/10/17 1746    Specimen:  Blood Updated:  01/10/17 1909     Magnesium 2.6 mg/dL     Basic Metabolic Panel [[id number]]  (Abnormal) Collected:  01/10/17 1746    Specimen:  Blood Updated:  01/10/17 1909     Glucose 246 (H) mg/dL      BUN 14 mg/dL      Creatinine 0.70 mg/dL      Sodium 140 mmol/L      Potassium 4.3 mmol/L      Chloride 110 (H) mmol/L      CO2 19.0 (L) mmol/L      Calcium 7.8 (L) mg/dL      eGFR Non African Amer 83 mL/min/1.73      BUN/Creatinine Ratio 20.0      Anion Gap 11.0 mmol/L     Narrative:       National Kidney Foundation Guidelines    Stage                           Description                             GFR                      1                               Normal or High                          90+  2                               Mild decrease                            60-89  3                               Moderate decrease                   30-59  4                               Severe decrease                   "     15-29  5                               Kidney failure                             <15    POC Glucose Fingerstick [[id number]]  (Abnormal) Collected:  01/10/17 1911    Specimen:  Blood Updated:  01/10/17 1916     Glucose 202 (H) mg/dL     Narrative:       Meter: [id number] : [id number] [employer] Nina    Calcium, Ionized [[id number]]  (Abnormal) Collected:  01/11/17 0109    Specimen:  Blood Updated:  01/11/17 0308     Ionized Calcium 1.05 (L) mmol/L     POC Glucose Fingerstick [[id number]]  (Abnormal) Collected:  01/10/17 2023    Specimen:  Blood Updated:  01/11/17 0343     Glucose 160 (H) mg/dL     Narrative:       Meter: [id number] : [id number] MaiaYast Nina    POC Glucose Fingerstick [[id number]]  (Normal) Collected:  01/10/17 2100    Specimen:  Blood Updated:  01/11/17 0343     Glucose 127 mg/dL     Narrative:       Meter: [id number] : [id number] Ángel Nina    POC Glucose Fingerstick [[id number]]  (Abnormal) Collected:  01/10/17 2306    Specimen:  Blood Updated:  01/11/17 0343     Glucose 165 (H) mg/dL     Narrative:       Meter: [id number] : [id number] [place] Evelyn    POC Glucose Fingerstick [[id number]]  (Abnormal) Collected:  01/11/17 0007    Specimen:  Blood Updated:  01/11/17 0343     Glucose 198 (H) mg/dL     Narrative:       Meter: [id number] : [id number] Karthik Verdugoh    POC Glucose Fingerstick [[id number]]  (Abnormal) Collected:  01/11/17 0108    Specimen:  Blood Updated:  01/11/17 0343     Glucose 252 (H) mg/dL     Narrative:       Meter: [id number] : [id number] Valentino Schmidt    POC Glucose Fingerstick [[id number]]  (Abnormal) Collected:  01/11/17 0152    Specimen:  Blood Updated:  01/11/17 0344     Glucose 205 (H) mg/dL     Narrative:       Meter: [id number] : [id number] Karthik Evelyn    POC Glucose Fingerstick [[id number]]  (Abnormal) Collected:  01/11/17 0316    Specimen:  Blood Updated:  01/11/17 0344     Glucose 153 (H) mg/dL     Narrative:       Meter: [id number]  : [id number] Ángel Wood    Phosphorus [[id number]]  (Abnormal) Collected:  01/11/17 0332    Specimen:  Blood Updated:  01/11/17 0403     Phosphorus 1.5 (L) mg/dL     Magnesium [[id number]]  (Normal) Collected:  01/11/17 0332    Specimen:  Blood Updated:  01/11/17 0403     Magnesium 2.3 mg/dL     Basic Metabolic Panel [[id number]]  (Abnormal) Collected:  01/11/17 0332    Specimen:  Blood Updated:  01/11/17 0403     Glucose 156 (H) mg/dL      BUN 13 mg/dL      Creatinine 0.60 mg/dL      Sodium 140 mmol/L      Potassium 4.2 mmol/L      Chloride 112 (H) mmol/L      CO2 21.0 mmol/L      Calcium 7.8 (L) mg/dL      eGFR Non African Amer 100 mL/min/1.73      BUN/Creatinine Ratio 21.7      Anion Gap 7.0 mmol/L     Narrative:       National Kidney Foundation Guidelines    Stage                           Description                             GFR                      1                               Normal or High                          90+  2                               Mild decrease                            60-89  3                               Moderate decrease                   30-59  4                               Severe decrease                       15-29  5                               Kidney failure                             <15    Blood Gas, Arterial [[id number]]  (Abnormal) Collected:  01/11/17 0353    Specimen:  Arterial Blood Updated:  01/11/17 0418     Site Arterial: left radial      Vern's Test N/A      pH, Arterial 7.436 pH units      pCO2, Arterial 30.8 (L) mm Hg      pO2, Arterial 85.4 mm Hg      HCO3, Arterial 20.7 mmol/L      Base Excess, Arterial -3.2 (L) mmol/L      Hemoglobin, Blood Gas 10.3 (L) g/dL      Hematocrit, Blood Gas 31.6 %      Oxyhemoglobin 95.6 %      Methemoglobin 0.9 %      Carboxyhemoglobin 1.3 %      CO2 Content 21.7 (L)      Barometric Pressure for Blood Gas -- mmHg       N/A        Modality RA      FIO2 21 %     POC Glucose Fingerstick [[id number]]  (Normal) Collected:   01/11/17 0409    Specimen:  Blood Updated:  01/11/17 0421     Glucose 118 mg/dL     Narrative:       Meter: [id number] : [id number] Ángel Wood    POC Glucose Fingerstick [[id number]]  (Normal) Collected:  01/11/17 0506    Specimen:  Blood Updated:  01/11/17 0507     Glucose 103 mg/dL     Narrative:       Meter: [id number] : [id number] Valentino Carrasquilloa    POC Glucose Fingerstick [[id number]]  (Normal) Collected:  01/11/17 0559    Specimen:  Blood Updated:  01/11/17 0600     Glucose 125 mg/dL     Narrative:       Meter: [id number] : [id number] Valentino Carrasquilloa    CBC & Differential [[id number]] Collected:  01/11/17 0543    Specimen:  Blood Updated:  01/11/17 0625    Narrative:       The following orders were created for panel order CBC & Differential.  Procedure                               Abnormality         Status                     ---------                               -----------         ------                     CBC Auto Differential[[id number]]         Abnormal            Final result                 Please view results for these tests on the individual orders.    CBC Auto Differential [[id number]]  (Abnormal) Collected:  01/11/17 0543    Specimen:  Blood Updated:  01/11/17 0625     WBC 9.65 10*3/mm3      RBC 3.45 (L) 10*6/mm3      Hemoglobin 10.2 (L) g/dL      Hematocrit 32.3 (L) %      MCV 93.6 fL      MCH 29.6 pg      MCHC 31.6 (L) g/dL      RDW 13.9 %      RDW-SD 47.8 fl      MPV 9.8 fL      Platelets 262 10*3/mm3      Neutrophil % 67.9 %      Lymphocyte % 23.5 (L) %      Monocyte % 7.2 %      Eosinophil % 0.9 %      Basophil % 0.2 %      Immature Grans % 0.3 %      Neutrophils, Absolute 6.55 10*3/mm3      Lymphocytes, Absolute 2.27 10*3/mm3      Monocytes, Absolute 0.69 10*3/mm3      Eosinophils, Absolute 0.09 (L) 10*3/mm3      Basophils, Absolute 0.02 10*3/mm3      Immature Grans, Absolute 0.03 10*3/mm3     Phosphorus [[id number]]  (Abnormal) Collected:  01/11/17 0543    Specimen:  Blood Updated:   01/11/17 0636     Phosphorus 1.7 (L) mg/dL     Magnesium [[id number]]  (Normal) Collected:  01/11/17 0543    Specimen:  Blood Updated:  01/11/17 0636     Magnesium 2.5 mg/dL     Comprehensive Metabolic Panel [[id number]]  (Abnormal) Collected:  01/11/17 0543    Specimen:  Blood Updated:  01/11/17 0636     Glucose 111 (H) mg/dL      BUN 13 mg/dL      Creatinine 0.60 mg/dL      Sodium 139 mmol/L      Potassium 4.4 mmol/L      Chloride 109 mmol/L      CO2 25.0 mmol/L      Calcium 7.8 (L) mg/dL      Total Protein 5.5 (L) g/dL      Albumin 2.90 (L) g/dL      ALT (SGPT) 23 U/L      AST (SGOT) 38 (H) U/L      Alkaline Phosphatase 255 (H) U/L      Total Bilirubin 0.4 mg/dL      eGFR Non African Amer 100 mL/min/1.73      Globulin 2.6 gm/dL      A/G Ratio 1.1 (L) g/dL      BUN/Creatinine Ratio 21.7      Anion Gap 5.0 mmol/L     Narrative:       National Kidney Foundation Guidelines    Stage                           Description                             GFR                      1                               Normal or High                          90+  2                               Mild decrease                            60-89  3                               Moderate decrease                   30-59  4                               Severe decrease                       15-29  5                               Kidney failure                             <15    Calcium, Ionized [[id number]]  (Abnormal) Collected:  01/11/17 0543    Specimen:  Blood Updated:  01/11/17 0648     Ionized Calcium 1.09 (L) mmol/L     POC Glucose Fingerstick [[id number]]  (Abnormal) Collected:  01/11/17 0653    Specimen:  Blood Updated:  01/11/17 0654     Glucose 147 (H) mg/dL     Narrative:       Meter: [id number] : [id number] Karthik Rivas    POC Glucose Fingerstick [[id number]]  (Abnormal) Collected:  01/11/17 0819    Specimen:  Blood Updated:  01/11/17 0828     Glucose 297 (H) mg/dL     Narrative:       Meter: [id number] : [id number] Enoch  Sakina    POC Glucose Fingerstick [[id number]]  (Abnormal) Collected:  01/11/17 0956    Specimen:  Blood Updated:  01/11/17 1015     Glucose 273 (H) mg/dL     Narrative:       Meter: [id number] : [id number] [employer]    Blood Culture [[id number]]  (Normal) Collected:  01/08/17 0959    Specimen:  Blood from Hand, Left Updated:  01/11/17 1101     Blood Culture No growth at 3 days     Blood Culture [[id number]]  (Normal) Collected:  01/08/17 0853    Specimen:  Blood from Hand, Left Updated:  01/11/17 1101     Blood Culture No growth at 3 days     POC Glucose Fingerstick [[id number]]  (Abnormal) Collected:  01/11/17 1057    Specimen:  Blood Updated:  01/11/17 1117     Glucose 215 (H) mg/dL     Narrative:       Meter: [id number] : [id number] [employer]    POC Glucose Fingerstick [[id number]]  (Abnormal) Collected:  01/11/17 1157    Specimen:  Blood Updated:  01/11/17 1207     Glucose 169 (H) mg/dL     Narrative:       Meter: [id number] : [id number] Enoch Presley    Urine Culture [[id number]]  (Abnormal) Collected:  01/08/17 0653    Specimen:  Urine from Urine, Catheter Updated:  01/11/17 1220     Urine Culture >100,000 CFU/mL Candida glabrata (A)     Urine Culture [[id number]]  (Abnormal) Collected:  01/08/17 1000    Specimen:  Urine from Urine, Catheter Updated:  01/11/17 1221     Urine Culture >100,000 CFU/mL Yeast isolated (A)       REFER TO [id number] FOR ID       POC Glucose Fingerstick [[id number]]  (Abnormal) Collected:  01/11/17 1211    Specimen:  Blood Updated:  01/11/17 1231     Glucose 171 (H) mg/dL     Narrative:       Meter: [id number] : [id number] [employer]    Calcium, Ionized [[id number]]  (Abnormal) Collected:  01/11/17 1146    Specimen:  Blood Updated:  01/11/17 1231     Ionized Calcium 1.03 (L) mmol/L     Magnesium [[id number]]  (Normal) Collected:  01/11/17 1146    Specimen:  Blood Updated:  01/11/17 1247     Magnesium 2.3 mg/dL     Phosphorus [[id number]]  (Normal) Collected:  01/11/17 1146     Specimen:  Blood Updated:  01/11/17 1247     Phosphorus 2.5 mg/dL     Basic Metabolic Panel [[id number]]  (Abnormal) Collected:  01/11/17 1146    Specimen:  Blood Updated:  01/11/17 1259     Glucose 208 (H) mg/dL      BUN 15 mg/dL      Creatinine 0.60 mg/dL      Sodium 141 mmol/L      Potassium 4.6 mmol/L      Chloride 109 mmol/L      CO2 22.0 mmol/L      Calcium 7.7 (L) mg/dL      eGFR Non African Amer 100 mL/min/1.73      BUN/Creatinine Ratio 25.0      Anion Gap 10.0 mmol/L     Narrative:       National Kidney Foundation Guidelines    Stage                           Description                             GFR                      1                               Normal or High                          90+  2                               Mild decrease                            60-89  3                               Moderate decrease                   30-59  4                               Severe decrease                       15-29  5                               Kidney failure                             <15    POC Glucose Fingerstick [[id number]]  (Abnormal) Collected:  01/11/17 1311    Specimen:  Blood Updated:  01/11/17 1313     Glucose 140 (H) mg/dL     Narrative:       Meter: [id number] : [id number] Enoch Presley    POC Glucose Fingerstick [[id number]]  (Normal) Collected:  01/11/17 1411    Specimen:  Blood Updated:  01/11/17 1412     Glucose 97 mg/dL     Narrative:       Meter: [id number] : [id number] Anjel Mary    [place] Draw [[id number]] Collected:  01/08/17 0654    Specimen:  Blood Updated:  01/11/17 1448    Narrative:       The following orders were created for panel order [place] Draw.  Procedure                               Abnormality         Status                     ---------                               -----------         ------                     Light Blue Top[[id number]]                                    Final result               Green Top (Gel)[[id number]]                                    Final result               Lavender Top[[id number]]                                      Final result               Gold Top - SST[[id number]]                                    Final result               Green Top (No Gel)[[id number]]                                                             Please view results for these tests on the individual orders.    POC Glucose Fingerstick [[id number]]  (Abnormal) Collected:  01/11/17 1622    Specimen:  Blood Updated:  01/11/17 1643     Glucose 140 (H) mg/dL     Narrative:       Meter: [id number] : [id number] [employer]    POC Glucose Fingerstick [[id number]]  (Abnormal) Collected:  01/11/17 1813    Specimen:  Blood Updated:  01/11/17 1826     Glucose 291 (H) mg/dL     Narrative:       Meter: [id number] : [id number] [employer]        Imaging Results (last 24 hours)     Procedure Component Value Units Date/Time    XR Chest 1 View [[id number]] Collected:  01/11/17 1031     Updated:  01/11/17 1516    Narrative:       EXAMINATION: XR CHEST 1 VIEW-01/11/2017:      INDICATION: Respiratory failure; E08.11-Diabetes mellitus due to  underlying condition with ketoacidosis with coma; R40.0-Somnolence;  E11.01-Type 2 diabetes mellitus with hyperosmolarity with coma;  I95.89-Other hypotension; A41.9-Sepsis, unspecified organism;  N39.0-Urinary tract infection, site not specified; R73.9-Hyperglycemia,  unspecified; E87.5-Hyperkalemia.      COMPARISON: 01/08/2017.     FINDINGS: The heart size is normal. The aortic arch is partially  calcified. The lungs are free of opacities. The osseous structures of  the chest are normal.           Impression:       No acute chest pathology, stable.     D:  01/11/2017  E:  01/11/2017     This report was finalized on 1/11/2017 3:14 PM by Dr. Geovanny Beltran MD.             Physical Exam:  Gen: NAD, lying in bed eyes open  Skin: warm, dry   Eyes: TROY, conjunctiva clear and moist   Resp/thorax: even effort, non labored, CTA   CV: RRR   ABD: soft, bowel  sounds +, no grimace with palpation  : bernardo to bedside drainage   Ext: trace edema, no redness   Neuro: awake, unable to follow commands, unable to answer yes/no questions, unable to make eye contact      Results from last 7 days  Lab Units 01/11/17  0543   WBC 10*3/mm3 9.65   HEMOGLOBIN g/dL 10.2*   HEMATOCRIT % 32.3*   PLATELETS 10*3/mm3 262       Results from last 7 days  Lab Units 01/11/17  1146 01/11/17  0543   SODIUM mmol/L 141 139   POTASSIUM mmol/L 4.6 4.4   CHLORIDE mmol/L 109 109   TOTAL CO2 mmol/L 22.0 25.0   BUN mg/dL 15 13   CREATININE mg/dL 0.60 0.60   CALCIUM mg/dL 7.7* 7.8*   BILIRUBIN mg/dL  --  0.4   ALK PHOS U/L  --  255*   ALT (SGPT) U/L  --  23   AST (SGOT) U/L  --  38*   GLUCOSE mg/dL 208* 111*       Impression: 66 y.o. female with shock (sepsis, DKA, hypovolemic), DM, C Diff colitis,   Candida UTI, advanced dementia    Plan:   Weakness, lethargy - continue to monitor and support sisters with understanding    Goals of care discussion  >30 min discussion with two sisters, Lacy and Ana, about changes in current clinical status. Patient is slightly more awake but not engaged. Sisters recognize that she has significant decline in baseline cognitive status.   Reviewed and discuss patient's wishes, recognition that patient was in severe shock when arrived in ED and options of placing keofeed and nutritional support vs transition to comfort focused plan of care without any further invasive interventions.   Sister, Ana, wants to have a few more hours before making a decision, but states she knows that her sister would not want any invasive interventions.   She recognizes how much the sister has a worsened cognitive status.   Sister/poa, Lacy, is ok to transition to comfort focused plan of care with no further antibiotics.       Sharon Ling, APRN  [phone]  01/11/17  6:51 PM

## 2017-01-11 NOTE — PROGRESS NOTES
Adult Nutrition  Assessment/PES    Patient Name:  Adrianna Casiano  YOB: 1950  MRN: [id number]  Admit Date:  1/8/2017    Assessment Date:  1/11/2017   Comments:  RD follow up. POC/GOC to be discussed further today. If POC/GOC includes nutrition support recommend initiating enteral nutrition. Recs provided. RD to continue to follow.       Rec: Peptamen 1.5 @ 25mL/hr, increase by 20mL Q6 hrs as tolerated to goal rate of 50mL/hr, provide 1 packet Prostat twice daily.    Note: 1L Peptamen 1.5 +Prostat BID will provide 1700kcals (100% est kcal needs), 98gms pro (98% est pro needs).        Reason for Assessment       01/11/17 1232    Reason for Assessment    Infectious Disease Septic shock;UTI    Neurological Metabolic encephalopathy;Alzheimer's;Parkinson's    Renal ARF   resolved    Skin Other (comment)   Rt heel blister, coccyx blister    Factors Affecting Nutrition --      01/11/17 1222    Reason for Assessment    Reason For Assessment/Visit multidisciplinary rounds    Time Spent (min) 30    Diagnosis Diagnosis   Patient Active Problem List   Diagnosis   • Abnormal weight loss   • Alzheimer's disease/nursing home patient   • Depression   • Hyperlipidemia   • Essential hypertension   • Hypoglycemia   • Hypothyroidism   • Parkinson's disease   • Type 2 diabetes mellitus   • Increased frequency of urination   •  Candida UTI    • Leukocytosis   • Lactic acidosis   • Metabolic encephalopathy on top of Alzheimer's disease   • Somnolence   • Scalp laceration   • Fall   • Anemia   • Sepsis but all cultures negative except Candida in urine   • Lactic acidosis   • Shock (Hypovolemic, Septic) Resolved   • Acute renal failure (R/O due to hypovolemia)- Resolved   • Metabolic acidosis (DKA and Lactic)   • Diabetic ketoacidosis Resolved   • Clostridium difficile enterocolitis. Diarrhea resolved                Nutrition/Diet History       01/11/17 1318    Nutrition/Diet History    Reported/Observed By RN;MD    Other  "POC/GOC to be further discussed today.               Labs/Tests/Procedures/Meds       01/11/17 1333    Labs/Tests/Procedures/Meds    Labs/Tests Review Reviewed    Medication Review Reviewed, pertinent   Insulin GTT @1 unit/hr, ABX, Synthroid, heparin              Estimated/Assessed Needs       01/11/17 1342    Calculation Measurements    Weight Used For Calculations 66.7 kg (147 lb 0.8 oz)    Height Used for Calculations 1.727 m (5' 8\")    Estimated/Assessed Energy Needs    Energy Need Method Kcal/kg;[place]    kcal/kg 25    25 Kcal/Kg (kcal) 1667.5    Estimated REE  ([place]) 1291    Diagnoses Factors 1.2    Estimated Kcal ([place])  1550    Estimated Kcal Range  1600-1800kcals/day    Estimated/Assessed Protein Needs    Weight Used for Protein Calculation 66.7 kg (147 lb 0.8 oz)    Protein (gm/kg) 1.5    1.5 Gm Protein (gm) 100.05    Estimated Protein Range 100 gm pro/day            Nutrition Prescription Ordered       01/11/17 1335    Nutrition Prescription PO    Current PO Diet NPO                Problem/Interventions:          Problem 2       01/11/17 1338    Nutrition Diagnoses Problem 2    Problem 2 Inadequate Nutrient Intake   kcal and pro    Etiology (related to) --   Diet/po intake/POC/GOC TBD    Signs/Symptoms (evidenced by) NPO   X 3 DAYS                  Intervention Goal       01/11/17 1339    Intervention Goal    General Other (comment)   ?POC/GOC            Nutrition Intervention       01/11/17 1340    Nutrition Intervention    RD/Tech Action Recommend/ordered;Follow Tx progress;Care plan reviewd            Nutrition Prescription       01/11/17 1340    Nutrition Prescription EN    Enteral Prescription Enteral begin/change;Enteral to supply    Enteral Route ND    Product Peptamin 1.5 tricia    TF Delivery Method Continuous    Continuous TF Goal Rate (mL/hr) 50 mL/hr    Continuous TF Starting Rate (mL/hr) 20 mL/hr    Continuous TF Goal Volume (mL) 1000 mL    Water flush (mL)  30 mL "    Water Flush Frequency Per hour    New EN Prescription Ordered? No, recommended    EN to Supply    Kcal/Day 2000 Kcal/Day    Protein (gm/day) 112 gm/day    Meet Estimated Kcal Need (%) 117 %    Meet Estimated Protein Need (%) 112 %    TF Free H2O (mL) 924 mL    Total Free H2O (mL/day) 1644 mL/day    Fiber Per Day (gm/day) 0 gm/day            Education/Evaluation       01/11/17 1342    Monitor/Evaluation    Monitor Per protocol;I&O;Pertinent labs;TF delivery/tolerance;Skin status;Other (comment)   POC/GOC            Electronically signed by:  Romina Velazquez RDN, JI  01/11/17 1:46 PM

## 2017-01-11 NOTE — PLAN OF CARE
Problem: Patient Care Overview (Adult)  Goal: Plan of Care Review  Outcome: Ongoing (interventions implemented as appropriate)    01/11/17 1000   Patient Care Overview   Progress no change   Outcome Evaluation   Outcome Summary/Follow up Plan discuss with sisters regarding goals and make decisions regarding comfort vs tube feeding.

## 2017-01-11 NOTE — PLAN OF CARE
Problem: Patient Care Overview (Adult)  Goal: Plan of Care Review  Outcome: Ongoing (interventions implemented as appropriate)    01/11/17 0700   Coping/Psychosocial Response Interventions   Plan Of Care Reviewed With patient   Patient Care Overview   Progress no change   Outcome Evaluation   Outcome Summary/Follow up Plan dka protocol followed. Increased moaning and tenseness, morphine given 2x. Pt seemes to  hands once, unable to assess if purposeful movement or not.        Goal: Adult Individualization and Mutuality  Outcome: Ongoing (interventions implemented as appropriate)  Goal: Discharge Needs Assessment  Outcome: Ongoing (interventions implemented as appropriate)    Problem: Diabetes, Type 2 (Adult)  Goal: Signs and Symptoms of Listed Potential Problems Will be Absent or Manageable (Diabetes, Type 2)  Outcome: Ongoing (interventions implemented as appropriate)    Problem: Pressure Ulcer Risk (Rigoberto Scale) (Adult,Obstetrics,Pediatric)  Goal: Skin Integrity  Outcome: Ongoing (interventions implemented as appropriate)    Problem: Confusion, Chronic (Adult)  Goal: Cognitive/Functional Impairments Minimized  Outcome: Ongoing (interventions implemented as appropriate)  Goal: Free from Harm/Injuries  Outcome: Ongoing (interventions implemented as appropriate)    Problem: Fall Risk (Adult)  Goal: Absence of Falls  Outcome: Ongoing (interventions implemented as appropriate)

## 2017-01-11 NOTE — SIGNIFICANT NOTE
Palliative Team Meeting Attendance  13:00                DO RICKIE Ga, RN, SOMMER Monsalve, [facility], [facility]-              RICKIE Baker M.Div., [facility], [facility]              BEVERLY Parker RN, SOMMER Siu, TERRANCE Bennett, LAVELL, SOMMER Howard, MSW

## 2017-01-11 NOTE — PROGRESS NOTES
INTENSIVIST NOTE     1/11/2017  Hospital Day: 3      Ms. Adrianna Casiano, 66 y.o. female is followed for:   Principal Problem:    Sepsis but all cultures negative except Candida in urine  Active Problems:     Candida UTI     Clostridium difficile enterocolitis. Diarrhea resolved    Shock (Hypovolemic, Septic) Resolved    Metabolic acidosis (DKA and Lactic)    Diabetic ketoacidosis Resolved    Metabolic encephalopathy on top of Alzheimer's disease    Acute renal failure (R/O due to hypovolemia)- Resolved    Alzheimer's disease/nursing home patient    Hypothyroidism    Parkinson's disease    Probable early left lower lobe Pneumonia. Noted on chest x-ray 1/11/17         SUBJECTIVE     Subjective    66-year-old debilitated demented nursing home patient just discharged from [facility] from the hospitalist service 1/3/17. She had been brought to the hospital from the nursing home after a fall on 12/23/16 and while being evaluated was found to have a high potassium and a urinary tract infection. She was admitted for urosepsis and a lactic acidosis and DKA but improved with IV antibiotics. Urine culture was negative but glycemic control was very difficult with very labile blood sugars. She was brought back to the ER 1/8/17 with a decreased level of consciousness. She was markedly hyperglycemic with glucose greater than 1000 and pH of 6.8 and obtunded (DKA). In addition the urine again look grossly infected (as it turns out however is culture negative so far).      Patient remains  poorly responsive, but he just her eyes open now. I have seen her look about but she does not focus on me and seems to stare past. When sleeping she has Cheyne-Johnson respirations, but no respiratory distress. Lays supine without a great deal of active movement. Eyes are open and she stares straight ahead most of the time. Her urine cultures came back negative but her C. difficile toxin was positive. Initial antimicrobial therapy was vancomycin and  "Merrem. Oral vancomycin was added to this regimen since her C. difficile toxin came back positive (she however had no NG tube and because of her DO NOT RESUSCITATE status we did not wish to place one). Because no other cultures have returned positive her regimen now includes IV Flagyl, and tigecycline. This is dual therapy for C. difficile but the tigecycline will also cover gram negatives in case cultures missed anything. Merrem has been stopped. Urine cultures also have grown greater than 100,000 yeast today from specimen sent 1/8/17.     The patient's relevant past medical, surgical and social history were reviewed and updated in Epic as appropriate.    OBJECTIVE     Visit Vitals   • /86   • Pulse 84   • Temp 99.3 °F (37.4 °C) (Core)   • Resp 22   • Ht 68\" (172.7 cm)   • Wt 147 lb (66.7 kg)   • SpO2 98%   • BMI 22.35 kg/m2        Flow (L/min): Room air    Flowsheet Rows         First Filed Value    Admission Height  68\" (172.7 cm) Documented at 01/08/2017 0632    Admission Weight  150 lb (68 kg) Documented at 01/08/2017 0632        Intake & Output (last day)       01/10 0701 - 01/11 0700 01/11 0701 - 01/12 0700    I.V. (mL/kg) 2790.3 (41.8) 522.4 (7.8)    IV Piggyback 1300     Total Intake(mL/kg) 4090.3 (61.3) 522.4 (7.8)    Urine (mL/kg/hr) 3260 (2)     Total Output 3260      Net +830.3 +522.4                Objective    Exam:  General Exam:  Elderly appearing white female who appears far older than stated age  HEENT: Pupils equal and reactive. Nose and throat clear.  Neck:                          Supple, no JVD, thyromegaly, or adenopathy  Lungs: Few crackles in left base posteriorly  Cardiovascular: Regular rate and rhythm without murmurs or gallops.  Abdomen: Soft nontender without organomegaly or masses.   and rectal: Howard catheter in place  Extremities: No cyanosis clubbing edema.  Neurologic:                 Poorly responsive. Will startle to voice and opens eyes but no eye contact. Will withdraw " to pain symmetrically    Chest X-Ray: New small minimal retrocardiac infiltrate when compared to previous films. Radiology did not comment on this abnormality, but it is definitely a new finding.    INFUSIONS    dextrose 5 % and sodium chloride 0.45 % with KCl 20 mEq/L 125 mL/hr Last Rate: 125 mL/hr (01/11/17 0856)   insulin regular infusion 1 unit/mL 0.1 Units/kg/hr Last Rate: 1 Units/hr (01/11/17 0820)         Results from last 7 days  Lab Units 01/11/17  0543 01/10/17  0400 01/08/17  1142   WBC 10*3/mm3 9.65 10.95* 17.28*   HEMOGLOBIN g/dL 10.2* 10.2* 8.6*   HEMATOCRIT % 32.3* 31.5* 31.7*   PLATELETS 10*3/mm3 262 294 365       Results from last 7 days  Lab Units 01/11/17  1146 01/11/17  0543   SODIUM mmol/L 141 139   POTASSIUM mmol/L 4.6 4.4   CHLORIDE mmol/L 109 109   TOTAL CO2 mmol/L 22.0 25.0   BUN mg/dL 15 13   CREATININE mg/dL 0.60 0.60   GLUCOSE mg/dL 208* 111*   CALCIUM mg/dL 7.7* 7.8*       Results from last 7 days  Lab Units 01/11/17  1146 01/11/17  0543 01/11/17  0332   MAGNESIUM mg/dL 2.3 2.5 2.3   PHOSPHORUS mg/dL 2.5 1.7* 1.5*       No results found for: SEDRATE  No results found for: BNP  Lab Results   Component Value Date    TROPONINI <0.006 12/23/2016     Lab Results   Component Value Date    TSH 1.214 01/09/2017     Lab Results   Component Value Date    LACTATE 1.7 01/09/2017     No results found for: CORTISOL      Results from last 7 days  Lab Units 01/11/17  0353 01/08/17  0742   PH, ARTERIAL pH units 7.436 6.838*   PCO2, ARTERIAL mm Hg 30.8* 15.3*   PO2 ART mm Hg 85.4 134.0*   HCO3 ART mmol/L 20.7 2.6*   FIO2 % 21 80       I reviewed the patient's results, images and medication.    Assessment/Plan   ASSESSMENT      Principal Problem:    Sepsis but all cultures negative except Candida in urine  Active Problems:     Candida UTI     Clostridium difficile enterocolitis. Diarrhea resolved    Shock (Hypovolemic, Septic) Resolved    Metabolic acidosis (DKA and Lactic)    Diabetic ketoacidosis  Resolved    Metabolic encephalopathy on top of Alzheimer's disease    Acute renal failure (R/O due to hypovolemia)- Resolved    Alzheimer's disease/nursing home patient    Hypothyroidism    Parkinson's disease    Probable early left lower lobe Pneumonia. Noted on chest x-ray 1/11/17      DISCUSSION: Complete resolution of DKA. Is no longer acidotic, is well oxygenated, and hemodynamically stable. She has received no form of nutrition and depending on family decision regarding comfort measures versus continuing medical care, may have to place an NG tube and tube feed.    PLAN     1. We will decrease fluids and put on maintenance insulin and stop insulin drip  2. Family discussion today. If we cannot come to some final decision regarding comfort care at etc., I am going to place an NG tube, start tube feedings, and switch her tigecycline to NG  vancomycin.  3. Regardless of family decision regarding tube feedings, NG tube etc., can probably be moved to the floor once she is off her insulin drip  4. Continue Diflucan      Addendum: Discussed the situation with 2 sisters who are making the patient's decisions for her. They both now understand the situation and feel comfortable with proceeding with transfer to the floor and comfort measures only (no enteral feeds or aggressive care). I plan to turn her care over to palliative care if they agree.    Plan of care and goals reviewed with mulitdisciplinary team at daily rounds.    I discussed the patient's findings and my recommendations with patient and family    Time spent Critical care 35 min (It does not include procedure time).    Vahid Mas MD  Intensive Care Medicine  01/11/17 2:33 PM

## 2017-01-12 NOTE — PLAN OF CARE
Problem: Patient Care Overview (Adult)  Goal: Plan of Care Review    01/12/17 1701   Coping/Psychosocial Response Interventions   Plan Of Care Reviewed With family   Patient Care Overview   Progress no change   Outcome Evaluation   Outcome Summary/Follow up Plan pt remains comfort care, awaiting palliative bed. one bm. sisters at bedside

## 2017-01-12 NOTE — PLAN OF CARE
Problem: Patient Care Overview (Adult)  Goal: Plan of Care Review  Outcome: Ongoing (interventions implemented as appropriate)    01/12/17 1010   Patient Care Overview   Progress declining   Outcome Evaluation   Outcome Summary/Follow up Plan pt is full comfort care. actively dying. minimally responsive         Problem: Dying Patient, Actively (Adult)  Goal: Identify Related Risk Factors and Signs and Symptoms  Outcome: Ongoing (interventions implemented as appropriate)    01/12/17 1010   Dying Patient, Actively   Signs and Symptoms (Actively Dying Patient) awareness reduced;total dependency;drowsiness for extended period;food/fluid disinterest/withdrawal;pain;swallowing difficulty;profound weakness       Goal: Comfort/Pain Control  Outcome: Ongoing (interventions implemented as appropriate)  Goal: Dying Process, Peace and Dignity  Outcome: Ongoing (interventions implemented as appropriate)

## 2017-01-12 NOTE — PROGRESS NOTES
Continued Stay Note  [facility]     Patient Name: Adrianna Casiano  MRN: [id number]  Today's Date: 1/12/2017    Admit Date: 1/8/2017          Discharge Plan       01/12/17 1740    Case Management/Social Work Plan    Plan Undetermined    Additional Comments Hospice consult received.  Chart reviewed.  Admitted with septic shock.  Obtunded.  Declining status.  Plan to meet with dtr to discuss options for hospice services 1/13/17.  If can be of further assist please contact.....ext [phone].              Discharge Codes     None        Expected Discharge Date and Time     Expected Discharge Date Expected Discharge Time    Jan 13, 2017             Floresita Sanderson RN

## 2017-01-12 NOTE — SIGNIFICANT NOTE
Palliative Team Meeting Attendance  13:00             DO RICKIE Ga, RN, CHZOHREH Monsalve, [facility], [facility]-              RICKIE Baker M.Div., [facility], [facility]                            BEVERLY Parker RN, ZOHREH Siu, TERRANCE Sanderson RN, PN

## 2017-01-12 NOTE — PROGRESS NOTES
INTENSIVIST NOTE     1/12/2017  Hospital Day: 4      Ms. Adrianna Casiano, 66 y.o. female is followed for:   Principal Problem:    Sepsis but all cultures negative except Candida in urine  Active Problems:     Candida UTI     Clostridium difficile enterocolitis. Diarrhea resolved    Shock (Hypovolemic, Septic) Resolved    Metabolic acidosis (DKA and Lactic)    Diabetic ketoacidosis Resolved    Metabolic encephalopathy on top of Alzheimer's disease    Acute renal failure (R/O due to hypovolemia)- Resolved    Alzheimer's disease/nursing home patient    Hypothyroidism    Parkinson's disease    Probable early left lower lobe Pneumonia. Noted on chest x-ray 1/11/17         SUBJECTIVE     Subjective    66-year-old debilitated demented nursing home patient just discharged from [facility] from the hospitalist service 1/3/17. She had been brought to the hospital from the nursing home after a fall on 12/23/16 and while being evaluated was found to have a high potassium and a urinary tract infection. She was admitted for urosepsis and a lactic acidosis and DKA but improved with IV antibiotics. Urine culture was negative but glycemic control was very difficult with very labile blood sugars. She was brought back to the ER 1/8/17 with a decreased level of consciousness. She was markedly hyperglycemic with glucose greater than 1000 and pH of 6.8 and obtunded (DKA). In addition the urine again look grossly infected (as it turns out however is culture negative so far).       Patient remains poorly responsive, but eyes are open and looks more alert. She will look about, but does not focus on me and seems to stare past. When sleeping she has Cheyne-Johnson respirations, but no respiratory distress. Lays supine without a great deal of active movement. Eyes are open and she stares straight ahead most of the time. Her initial urine cultures came back negative for bacteria, but her C. difficile toxin was positive. Initial antimicrobial therapy was  "vancomycin and Merrem. Oral vancomycin was added to this regimen since her C. difficile toxin came back positive (she however had no NG tube and because of her DO NOT RESUSCITATE status we did not wish to place one). Her regimen for C. difficile now includes IV Flagyl, and tigecycline. This is dual therapy for C. difficile but the tigecycline will also cover gram negatives in case cultures missed anything. Merrem has been stopped. On follow-up her urine cultures ended up growing a yeast and this was identified today as candidal glabrata which would be resistant to the Diflucan we placed her on.    The patient's relevant past medical, surgical and social history were reviewed and updated in Epic as appropriate.    OBJECTIVE     Visit Vitals   • /59   • Pulse 84   • Temp 98.9 °F (37.2 °C) (Axillary)   • Resp 20   • Ht 68\" (172.7 cm)   • Wt 147 lb (66.7 kg)   • SpO2 96%   • BMI 22.35 kg/m2        Flow (L/min): 4    Flowsheet Rows         First Filed Value    Admission Height  68\" (172.7 cm) Documented at 01/08/2017 0632    Admission Weight  150 lb (68 kg) Documented at 01/08/2017 0632        Intake & Output (last day)       01/11 0701 - 01/12 0700 01/12 0701 - 01/13 0700    I.V. (mL/kg) 1154.8 (17.3) 397 (6)    IV Piggyback 100 200    Total Intake(mL/kg) 1254.8 (18.8) 597 (9)    Urine (mL/kg/hr) 1700 (1.1) 200 (0.4)    Stool 0 (0)     Total Output 1700 200    Net -445.2 +397          Unmeasured Stool Occurrence 1 x           Objective    Exam:  General Exam:  Well-developed middle-aged white female in no acute distress  HEENT: Pupils equal and reactive. Nose and throat clear.  Neck:                          Supple, no JVD, thyromegaly, or adenopathy  Lungs: Clear anteriorly on inspiration, but on expiration she has a tendency to moan and make noise. No actual wheezing  Cardiovascular: Regular rate and rhythm without murmurs or gallops.  Abdomen: Soft nontender without organomegaly or masses.   and rectal: Howard " catheter in place  Extremities: No cyanosis or clubbing and trace to 1+ edema noted today.  Neurologic:                 Will move extremities spontaneously but not to command      INFUSIONS         Results from last 7 days  Lab Units 01/11/17  0543 01/10/17  0400 01/08/17  1142   WBC 10*3/mm3 9.65 10.95* 17.28*   HEMOGLOBIN g/dL 10.2* 10.2* 8.6*   HEMATOCRIT % 32.3* 31.5* 31.7*   PLATELETS 10*3/mm3 262 294 365       Results from last 7 days  Lab Units 01/11/17  1146 01/11/17  0543   SODIUM mmol/L 141 139   POTASSIUM mmol/L 4.6 4.4   CHLORIDE mmol/L 109 109   TOTAL CO2 mmol/L 22.0 25.0   BUN mg/dL 15 13   CREATININE mg/dL 0.60 0.60   GLUCOSE mg/dL 208* 111*   CALCIUM mg/dL 7.7* 7.8*       Results from last 7 days  Lab Units 01/11/17  1146 01/11/17  0543 01/11/17  0332   MAGNESIUM mg/dL 2.3 2.5 2.3   PHOSPHORUS mg/dL 2.5 1.7* 1.5*       No results found for: SEDRATE  No results found for: BNP  Lab Results   Component Value Date    TROPONINI <0.006 12/23/2016     Lab Results   Component Value Date    TSH 1.214 01/09/2017     Lab Results   Component Value Date    LACTATE 1.7 01/09/2017     No results found for: CORTISOL      Results from last 7 days  Lab Units 01/11/17  0353 01/08/17  0742   PH, ARTERIAL pH units 7.436 6.838*   PCO2, ARTERIAL mm Hg 30.8* 15.3*   PO2 ART mm Hg 85.4 134.0*   HCO3 ART mmol/L 20.7 2.6*   FIO2 % 21 80     Urine culture: Candida glabrata from 1/8/17    I reviewed the patient's results, images and medication.    Assessment/Plan   ASSESSMENT      Principal Problem:    Sepsis but all cultures negative except Candida in urine  Active Problems:     Candida UTI     Clostridium difficile enterocolitis. Diarrhea resolved    Shock (Hypovolemic, Septic) Resolved    Metabolic acidosis (DKA and Lactic)    Diabetic ketoacidosis Resolved    Metabolic encephalopathy on top of Alzheimer's disease    Acute renal failure (R/O due to hypovolemia)- Resolved    Alzheimer's disease/nursing home patient     Hypothyroidism    Parkinson's disease    Probable early left lower lobe Pneumonia. Noted on chest x-ray 1/11/17      DISCUSSION: Patient appears quite comfortable. She is on a low dose of Levemir which is slightly more than she was taking at home. I kept her on IV Flagyl for her C. difficile and will switch her Diflucan to Micafungin to cover her Candida glabrata. I am doing this just to keep her from going into DKA. We are not checking Accu-Cheks and she is not eating. She has both narcotics and Ativan for comfort measures and we are awaiting a palliative care bed.    PLAN     1. Transfer to palliative care  2. Consult hospice  3. Comfort measures  4. We will continue IV Flagyl for C. difficile but will stop tigecycline  5. Change Diflucan to micafungin  6. We will ask Dr. Draper if he will assume care even if we cannot find a bed for her on the floor.    Plan of care and goals reviewed with mulitdisciplinary team at daily rounds.    I discussed the patient's findings and my recommendations with patient and nursing staff    Time spent Critical care 25 min (It does not include procedure time).    Vahid Mas MD  Intensive Care Medicine  01/12/17 2:25 PM

## 2017-01-12 NOTE — PLAN OF CARE
Problem: Patient Care Overview (Adult)  Goal: Plan of Care Review  Outcome: Ongoing (interventions implemented as appropriate)    01/12/17 0525   Coping/Psychosocial Response Interventions   Plan Of Care Reviewed With patient   Patient Care Overview   Progress no change   Outcome Evaluation   Outcome Summary/Follow up Plan Pt full comfort measures, AND. Remained diaphoretic with kussmaul respirations. Morphine given x 1 for pain cues and increased respiratory effort, with improvement in RR, and able to rest well.

## 2017-01-12 NOTE — PROGRESS NOTES
Adult Nutrition  Assessment/PES    Patient Name:  Adrianna Casiano  YOB: 1950  MRN: [id number]  Admit Date:  1/8/2017    Assessment Date:  1/12/2017   Comments: Pt now comfort measures, nutrition support not appropriate for current POC/GOC. RD will remain available PRN.         Reason for Assessment       01/12/17 0957    Reason for Assessment    Reason For Assessment/Visit multidisciplinary rounds;follow up protocol    Time Spent (min) 20              Nutrition/Diet History       01/12/17 0957    Nutrition/Diet History    Reported/Observed By RN;MD    Other POC/GOC discussion 1/11-pt now comfort measures only.         Problem/Interventions:        Problem 1       01/12/17 0958    Nutrition Diagnoses Problem 1    Problem 1 Nutrition Appropriate for Condition at this Time    Etiology (related to) Goals of Care    Signs/Symptoms (evidenced by) NPO   Pt now comfort measures only              Education/Evaluation       01/12/17 0959    Monitor/Evaluation    Monitor Per protocol          Electronically signed by:  Romina Velazquez RDN, JI  01/12/17 9:59 AM

## 2017-01-13 NOTE — PROGRESS NOTES
"Palliative Care Progress Note    Date of Admission: 1/8/2017    Subjective: pt appears more restful. Sisters, Ana and Lacy, at bedside. Ana is returning to [place] on Sunday.   Reviewed plan of care to allow patient to be in most natural state, no further IV antibiotics and minimal monitoring of glucose with insulin coverage.   Patient has responded minimally to the sisters, but they express their sisters frustration of not being able to communicate.   Minimal prn use for symptom management    Reviewed scheduled and prn medications.     glycopyrrolate  •  LORazepam  •  Morphine  •  Morphine  •  sodium chloride    Objective:  PPS 10%  Visit Vitals   • /79 (BP Location: Left arm, Patient Position: Lying)   • Pulse 97   • Temp 98.4 °F (36.9 °C) (Axillary)   • Resp 18   • Ht 68\" (172.7 cm)   • Wt 147 lb (66.7 kg)   • SpO2 97%   • BMI 22.35 kg/m2      Intake & Output (last day)       01/12 0701 - 01/13 0700    I.V. (mL/kg) 397 (6)    IV Piggyback 200    Total Intake(mL/kg) 597 (9)    Urine (mL/kg/hr) 345 (0.3)    Stool 0 (0)    Total Output 345    Net +252         Unmeasured Stool Occurrence 1 x        Lab Results (last 24 hours)     Procedure Component Value Units Date/Time    Blood Culture [[id number]]  (Normal) Collected:  01/08/17 0853    Specimen:  Blood from Hand, Left Updated:  01/12/17 1101     Blood Culture No growth at 4 days     Blood Culture [[id number]]  (Normal) Collected:  01/08/17 0959    Specimen:  Blood from Hand, Left Updated:  01/12/17 1101     Blood Culture No growth at 4 days         Imaging Results (last 24 hours)     ** No results found for the last 24 hours. **          Physical Exam:  Gen: NAD, restful lying in bed  Skin: warm, dry   Eyes: TROY, conjunctiva clear and moist   Resp/thorax: even effort, non labored, CTA   CV: RRR   ABD: soft, bowel sounds + nontender  : bernardo to bedside drainage   Ext: no edema, no redness, right foot cool   Neuro: unresponsive to name or touch,  no " myoclonus         Results from last 7 days  Lab Units 01/11/17  0543   WBC 10*3/mm3 9.65   HEMOGLOBIN g/dL 10.2*   HEMATOCRIT % 32.3*   PLATELETS 10*3/mm3 262       Results from last 7 days  Lab Units 01/11/17  1146 01/11/17  0543   SODIUM mmol/L 141 139   POTASSIUM mmol/L 4.6 4.4   CHLORIDE mmol/L 109 109   TOTAL CO2 mmol/L 22.0 25.0   BUN mg/dL 15 13   CREATININE mg/dL 0.60 0.60   CALCIUM mg/dL 7.7* 7.8*   BILIRUBIN mg/dL  --  0.4   ALK PHOS U/L  --  255*   ALT (SGPT) U/L  --  23   AST (SGOT) U/L  --  38*   GLUCOSE mg/dL 208* 111*       Impression: 66 y.o. female with advanced dementia (Parkinsons, Alzheimer), C diff colitis, Candida UTI,     Plan:   Dyspnea, changes with respiratory effort - prn morphine.    Goals of care - primary comfort focused, no further IV antibiotics, allow patient to be in more natural state and allow natural death.   Reviewed with sisters, Lacy and Ana, the plan of care and in agreement.   Continue to provide support with anticipatory grief and dying process    Sharon Ling, APRN  [phone]  01/12/17  10:23 PM

## 2017-01-13 NOTE — PROGRESS NOTES
Adult Nutrition  Assessment/PES    Patient Name:  Adrianna Casiano  YOB: 1950  MRN: [id number]  Admit Date:  1/8/2017    Assessment Date:  1/13/2017        Reason for Assessment       01/13/17 1027    Reason for Assessment    Reason For Assessment/Visit NPO/Clr Policy;multidisciplinary rounds   npo x5 days    Time Spent (min) 20    Diagnosis --   per notes this adm                  Labs/Tests/Procedures/Meds       01/13/17 1027    Labs/Tests/Procedures/Meds    Labs/Tests Review --   no new labs                Nutrition Prescription Ordered       01/13/17 1027    Nutrition Prescription PO    Current PO Diet NPO   x5 days                Problem/Interventions:        Problem 1       01/13/17 1028    Nutrition Diagnoses Problem 1    Problem 1 Nutrition Appropriate for Condition at this Time    Etiology (related to) Goals of Care    Signs/Symptoms (evidenced by) --   comfort measures only                    Intervention Goal       01/13/17 1028    Intervention Goal    General Palliative Care;Hospice Care            Nutrition Intervention       01/13/17 1028    Nutrition Intervention    RD/Tech Action Care plan reviewd;Follow Tx progress              Education/Evaluation       01/13/17 1028    Monitor/Evaluation    Monitor Per protocol          Electronically signed by:  Alva Nolasco MS RD/LD CNSC  01/13/17 10:28 AM

## 2017-01-13 NOTE — PROGRESS NOTES
"Palliative Care Progress Note    Date of Admission: 1/8/2017    Subjective:  Sisters at bedside.     Reviewed scheduled and prn medications.     glycopyrrolate  •  ketorolac  •  LORazepam  •  Morphine  •  Morphine  •  sodium chloride    Objective:  PPS 10%  Visit Vitals   • /79 (BP Location: Left arm, Patient Position: Lying)   • Pulse 80   • Temp 99.1 °F (37.3 °C) (Axillary)   • Resp 16   • Ht 68\" (172.7 cm)   • Wt 147 lb (66.7 kg)   • SpO2 98%   • BMI 22.35 kg/m2      Intake & Output (last day)       01/12 0701 - 01/13 0700 01/13 0701 - 01/14 0700    I.V. (mL/kg) 397 (6)     IV Piggyback 200     Total Intake(mL/kg) 597 (9)     Urine (mL/kg/hr) 345 (0.2) 350 (0.5)    Stool 0 (0)     Total Output 345 350    Net +252 -350          Unmeasured Stool Occurrence 1 x         Lab Results (last 24 hours)     Procedure Component Value Units Date/Time    Blood Culture [[id number]]  (Normal) Collected:  01/08/17 0853    Specimen:  Blood from Hand, Left Updated:  01/13/17 1101     Blood Culture No growth at 5 days     Blood Culture [[id number]]  (Normal) Collected:  01/08/17 0959    Specimen:  Blood from Hand, Left Updated:  01/13/17 1101     Blood Culture No growth at 5 days         Imaging Results (last 24 hours)     ** No results found for the last 24 hours. **          Physical Exam:  Gen: NAD, restful, awake  Skin: warm, dry   Eyes: TROY, conjunctiva clear and moist   Resp/thorax: even effort, non labored, CTA   CV: RRR   ABD: soft, bowel sounds hypoactive, no grimace with palpation  : bernardo to bedside drainage   Ext: trace edema, no redness   Neuro: awake, no myoclonus, unable to follow commands, unable to answer yes, no. No sustained eye contact.      Results from last 7 days  Lab Units 01/11/17  0543   WBC 10*3/mm3 9.65   HEMOGLOBIN g/dL 10.2*   HEMATOCRIT % 32.3*   PLATELETS 10*3/mm3 262       Results from last 7 days  Lab Units 01/11/17  1146 01/11/17  0543   SODIUM mmol/L 141 139   POTASSIUM mmol/L 4.6 4.4 "   CHLORIDE mmol/L 109 109   TOTAL CO2 mmol/L 22.0 25.0   BUN mg/dL 15 13   CREATININE mg/dL 0.60 0.60   CALCIUM mg/dL 7.7* 7.8*   BILIRUBIN mg/dL  --  0.4   ALK PHOS U/L  --  255*   ALT (SGPT) U/L  --  23   AST (SGOT) U/L  --  38*   GLUCOSE mg/dL 208* 111*       Impression: 66 y.o. female with advanced dementia, parkinson's and alzheimer, shock (DKA, sepsis, hypovolemic), UTI with limited Rx, DM    Plan:   Respiratory secretions - diminished IV fluids, prn robinul    General myofascial pain - chronic extremity pain, prn opioid    UTI, pneumonia - prn toradol for elevated temperature    Xerostomia - palliative care oral rinse    DM - no po intake, levemir insulin at bedtime discontinued. No further scheduled accu checks.      Plan - comfort care at end of life  Sisters at bedside, reviewed signs and symptoms with dying process and provide support.         Sharon Ling, APRN  [phone]  01/13/17  5:24 PM

## 2017-01-13 NOTE — SIGNIFICANT NOTE
Palliative Team Meeting Attendance  13:00  Edd Mercer DO Lauren Coons, MSSMOOTH Monsalve, [facility], [facility]              RICKIE Baker M.Div., [facility], [facility]              BEVERLY Parker RN, CHZOHREH Siu, TERRANCE Bennett RN CHPN

## 2017-01-13 NOTE — PLAN OF CARE
Problem: Patient Care Overview (Adult)  Goal: Plan of Care Review  Outcome: Ongoing (interventions implemented as appropriate)    01/12/17 1701 01/12/17 2000 01/13/17 1610   Coping/Psychosocial Response Interventions   Plan Of Care Reviewed With --  other (see comments)  (family not present, pt allowed to sleep) --    Patient Care Overview   Progress no change --  --    Outcome Evaluation   Outcome Summary/Follow up Plan --  --  pt shows no sign of change, no evidence of pain or discomfort, family at bedside       Goal: Adult Individualization and Mutuality  Outcome: Ongoing (interventions implemented as appropriate)  Goal: Discharge Needs Assessment  Outcome: Ongoing (interventions implemented as appropriate)    Problem: Confusion, Chronic (Adult)  Goal: Cognitive/Functional Impairments Minimized  Outcome: Ongoing (interventions implemented as appropriate)  Goal: Free from Harm/Injuries  Outcome: Ongoing (interventions implemented as appropriate)    Problem: Fall Risk (Adult)  Goal: Absence of Falls  Outcome: Ongoing (interventions implemented as appropriate)    Problem: Dying Patient, Actively (Adult)  Goal: Identify Related Risk Factors and Signs and Symptoms  Outcome: Ongoing (interventions implemented as appropriate)  Goal: Comfort/Pain Control  Outcome: Ongoing (interventions implemented as appropriate)  Goal: Dying Process, Peace and Dignity  Outcome: Ongoing (interventions implemented as appropriate)

## 2017-01-13 NOTE — PROGRESS NOTES
Continued Stay Note  [facility]     Patient Name: Adrianna Casiano  MRN: [id number]  Today's Date: 1/13/2017    Admit Date: 1/8/2017          Discharge Plan       01/13/17 0269    Case Management/Social Work Plan    Plan Actively dying    Patient/Family In Agreement With Plan yes    Additional Comments Hospice consult received.  Chart reviewed,  Admitted with septic shock.  Known history of Alzheimer's dementia, Parkinson's Disease and other co-morbidities.  Resting quielty during visit.  No obvious distress.  Did not rouse though sisters at bedside was awake much of the morning.  Both sisters aware pt likely declining to die.  Given Gone From My Sight- The Dying Experience.  Following for hospice support.  If can be of further assist please contact.....ext [phone].              Discharge Codes     None        Expected Discharge Date and Time     Expected Discharge Date Expected Discharge Time    Jan 14, 2017             Floresita Sanderson RN

## 2017-01-13 NOTE — PLAN OF CARE
Problem: Patient Care Overview (Adult)  Goal: Plan of Care Review  Outcome: Ongoing (interventions implemented as appropriate)  Pt rested throughout the night.  Did not at any time appear uncomfortable.  Goal: Adult Individualization and Mutuality  Outcome: Ongoing (interventions implemented as appropriate)  Goal: Discharge Needs Assessment  Outcome: Ongoing (interventions implemented as appropriate)    Problem: Diabetes, Type 2 (Adult)  Goal: Signs and Symptoms of Listed Potential Problems Will be Absent or Manageable (Diabetes, Type 2)  Outcome: Ongoing (interventions implemented as appropriate)    01/13/17 0553   Diabetes, Type 2   Problems Assessed (Type 2 Diabetes) all   Problems Present (Type 2 Diabetes) situational response         Problem: Pressure Ulcer Risk (Rigoberto Scale) (Adult,Obstetrics,Pediatric)  Goal: Skin Integrity  Outcome: Ongoing (interventions implemented as appropriate)    01/13/17 0553   Pressure Ulcer Risk (Rigoberto Scale) (Adult,Obstetrics,Pediatric)   Skin Integrity making progress toward outcome         Problem: Confusion, Chronic (Adult)  Goal: Cognitive/Functional Impairments Minimized  Outcome: Ongoing (interventions implemented as appropriate)    01/13/17 0553   Confusion, Chronic (Adult)   Cognitive/Functional Impairments Minimized making progress toward outcome       Goal: Free from Harm/Injuries  Outcome: Ongoing (interventions implemented as appropriate)    01/13/17 0553   Confusion, Chronic (Adult)   Free from Harm/Injuries making progress toward outcome         Problem: Fall Risk (Adult)  Goal: Absence of Falls  Outcome: Ongoing (interventions implemented as appropriate)    01/13/17 0553   Fall Risk (Adult)   Absence of Falls making progress toward outcome         Problem: Dying Patient, Actively (Adult)  Goal: Identify Related Risk Factors and Signs and Symptoms  Outcome: Ongoing (interventions implemented as appropriate)    01/13/17 0553   Dying Patient, Actively   Actively Dying  Patient: Related Risk Factors disease progression;emotional state;age/developmental level;level of consciousness   Signs and Symptoms (Actively Dying Patient) awareness reduced;frequently disoriented;profound weakness       Goal: Comfort/Pain Control  Outcome: Ongoing (interventions implemented as appropriate)    01/13/17 0553   Dying Patient, Actively (Adult)   Comfort/Pain Control making progress toward outcome       Goal: Dying Process, Peace and Dignity  Outcome: Ongoing (interventions implemented as appropriate)    01/13/17 0553   Dying Patient, Actively (Adult)   Dying Process, Peace and Dignity making progress toward outcome

## 2017-01-14 NOTE — PROGRESS NOTES
"Palliative Care Progress Note    Date of Admission: 1/8/2017    Subjective:  Patient is unresponsive.  Nursing staff does report patient has been having increasing work of breathing today which seems to starting to be controlled with adjustment in the patient's symptom management regimen.  No current facility-administered medications on file prior to encounter.      Current Outpatient Prescriptions on File Prior to Encounter   Medication Sig Dispense Refill   • atorvastatin (LIPITOR) 40 MG tablet Take 40 mg by mouth Daily.     • carbidopa-levodopa (SINEMET)  MG per tablet Take 1.5 tablets by mouth 3 (Three) Times a Day.     • donepezil (ARICEPT) 10 MG tablet Take 10 mg by mouth Daily.     • Insulin Glargine (LANTUS SOLOSTAR) 100 UNIT/ML injection pen Inject 5 Units under the skin Every Night.     • levothyroxine (SYNTHROID, LEVOTHROID) 50 MCG tablet Take 50 mcg by mouth Daily.     • lisinopril (PRINIVIL,ZESTRIL) 5 MG tablet Take 1 tablet by mouth Daily.     • memantine (NAMENDA XR) 28 MG capsule sustained-release 24 hr extended release capsule Take 28 mg by mouth Daily.     • NOVOLOG FLEXPEN 100 UNIT/ML solution pen-injector sc pen Per Sliding Scale     • PARoxetine (PAXIL) 30 MG tablet Take 30 mg by mouth Daily.     • QUEtiapine (SEROquel) 25 MG tablet Take 0.5 tablets by mouth Every Night.          glycopyrrolate  •  ipratropium-albuterol  •  ketorolac  •  LORazepam  •  LORazepam  •  Morphine  •  Morphine  •  sodium chloride    Objective:   Visit Vitals   • /78 (BP Location: Left arm, Patient Position: Lying)   • Pulse 89   • Temp 99.3 °F (37.4 °C) (Axillary)   • Resp 16   • Ht 68\" (172.7 cm)   • Wt 147 lb (66.7 kg)   • SpO2 99%   • BMI 22.35 kg/m2        Intake/Output Summary (Last 24 hours) at 01/14/17 1340  Last data filed at 01/14/17 0446   Gross per 24 hour   Intake      0 ml   Output   1145 ml   Net  -1145 ml     Physical Exam:      General Appearance:    unresponsive,    Head:    Normocephalic, " without obvious abnormality, atraumatic   Eyes:            Lids and lashes normal, conjunctivae and sclerae normal, no   icterus, no pallor, corneas clear, PERRLA   Ears:    Ears appear intact with no abnormalities noted   Throat:   No oral lesions, no thrush, oral mucosa moist   Neck:   No adenopathy, supple, trachea midline, no thyromegaly, no     carotid bruit, no JVD   Back:     No kyphosis present, no scoliosis present, no skin lesions,       erythema or scars, no tenderness to percussion or                   palpation,   range of motion normal   Lungs:     decreased breath sounds throughout.  Appearing     Heart:    Regular rhythm and normal rate, normal S1 and S2, no            murmur, no gallop, no rub, no click   Breast Exam:    Deferred   Abdomen:     Normal bowel sounds, no masses, no organomegaly, soft        non-tender, non-distended, no guarding, no rebound                 tenderness   Genitalia:    Deferred   Extremities:   Moves all extremities well, no edema, no cyanosis, no              redness   Pulses:   Pulses palpable and equal bilaterally   Skin:   No bleeding, bruising or rash   Lymph nodes:   No palpable adenopathy           Results from last 7 days  Lab Units 01/11/17  0543   WBC 10*3/mm3 9.65   HEMOGLOBIN g/dL 10.2*   HEMATOCRIT % 32.3*   PLATELETS 10*3/mm3 262       Results from last 7 days  Lab Units 01/11/17  1146 01/11/17  0543   SODIUM mmol/L 141 139   POTASSIUM mmol/L 4.6 4.4   CHLORIDE mmol/L 109 109   TOTAL CO2 mmol/L 22.0 25.0   BUN mg/dL 15 13   CREATININE mg/dL 0.60 0.60   CALCIUM mg/dL 7.7* 7.8*   BILIRUBIN mg/dL  --  0.4   ALK PHOS U/L  --  255*   ALT (SGPT) U/L  --  23   AST (SGOT) U/L  --  38*   GLUCOSE mg/dL 208* 111*       Impression: 66 y.o. female with advanced dementia, parkinson's and alzheimer, shock (DKA, sepsis, hypovolemic), UTI with limited Rx, DM     Plan:   Respiratory secretions - diminished IV fluids, prn robinul     General myofascial pain - chronic extremity  pain, will start patient on scheduled morphine     UTI, pneumonia - prn toradol for elevated temperature     Xerostomia - palliative care oral rinse     DM - no po intake, levemir insulin at bedtime discontinued. No further scheduled accu checks.     Dyspnea-I will start patient on scheduled morphine as well as when necessary nebulizer treatments     Plan - comfort care at end of life  Sisters at bedside, reviewed signs and symptoms with dying process and provide support.            Aung Draper,   01/14/17  1:40 PM

## 2017-01-14 NOTE — SIGNIFICANT NOTE
Death Note: Pt comfort measures/AND, presented to ED 1/8/17 with co morbidities. Exam confirms with auscultation zero audible heart tones and zero audible respirations. Ms.Sharon Casiano was pronounced dead at 1605.  MD notified by Patient's RN.    Jorge Cheek, RN  Clinical House Supervisor  1/14/2017 4:19 PM

## 2017-01-14 NOTE — PLAN OF CARE
Problem: Dying Patient, Actively (Adult)  Goal: Dying Process, Peace and Dignity  Outcome: Ongoing (interventions implemented as appropriate)    01/14/17 0321   Dying Patient, Actively (Adult)   Dying Process, Peace and Dignity achieves outcome   Respiratory distress during the am, resolved by afternoon

## 2017-01-14 NOTE — PLAN OF CARE
Problem: Patient Care Overview (Adult)  Goal: Plan of Care Review  Outcome: Ongoing (interventions implemented as appropriate)    01/14/17 0321   Patient Care Overview   Progress progress toward functional goals as expected   Outcome Evaluation   Outcome Summary/Follow up Plan patient shows no sign of pain or restlessness, skin integrity maintained via turning, bernardo care completed.       Goal: Adult Individualization and Mutuality  Outcome: Ongoing (interventions implemented as appropriate)  Goal: Discharge Needs Assessment  Outcome: Ongoing (interventions implemented as appropriate)    Problem: Diabetes, Type 2 (Adult)  Goal: Signs and Symptoms of Listed Potential Problems Will be Absent or Manageable (Diabetes, Type 2)  Outcome: Ongoing (interventions implemented as appropriate)    Problem: Pressure Ulcer Risk (Rigoberto Scale) (Adult,Obstetrics,Pediatric)  Goal: Skin Integrity  Outcome: Ongoing (interventions implemented as appropriate)    Problem: Confusion, Chronic (Adult)  Goal: Cognitive/Functional Impairments Minimized  Outcome: Ongoing (interventions implemented as appropriate)  Goal: Free from Harm/Injuries  Outcome: Ongoing (interventions implemented as appropriate)    Problem: Fall Risk (Adult)  Goal: Absence of Falls  Outcome: Ongoing (interventions implemented as appropriate)    Problem: Dying Patient, Actively (Adult)  Goal: Identify Related Risk Factors and Signs and Symptoms  Outcome: Ongoing (interventions implemented as appropriate)  Goal: Comfort/Pain Control  Outcome: Ongoing (interventions implemented as appropriate)  Goal: Dying Process, Peace and Dignity  Outcome: Ongoing (interventions implemented as appropriate)

## 2017-08-01 NOTE — DISCHARGE SUMMARY
Admit date: 1/8/2017  Discharge date: 1/14/2017      Final diagnosis:    1. Diabetic ketoacidosis with coma associated with diabetes mellitus due to underlying condition    2. Somnolence    3. Hyperosmolar coma    4. Other specified hypotension    5. Sepsis due to urinary tract infection    6. Hyperglycemia    7. Hyperkalemia                      Brief hospital course:  Patient was evaluated and admitted to the hospital for change in mental status and confusion.  Was found to have Diabetic ketoacidosis with coma associated with diabetes mellitus due to underlying condition [E08.11].  Patient was admitted to the intensive care unit. Despite appropriate treatment and evaluation by multiple specialities patient continued to decline.  Palliative medicine evaluated the patient for comfort and support of family.  Patient did decline and die while in the hospital.      Aung Draper DO  9:37 AM  8/1/17